# Patient Record
Sex: FEMALE | Race: BLACK OR AFRICAN AMERICAN | NOT HISPANIC OR LATINO | Employment: OTHER | URBAN - METROPOLITAN AREA
[De-identification: names, ages, dates, MRNs, and addresses within clinical notes are randomized per-mention and may not be internally consistent; named-entity substitution may affect disease eponyms.]

---

## 2017-02-16 DIAGNOSIS — E78.5 HYPERLIPIDEMIA: ICD-10-CM

## 2017-02-16 DIAGNOSIS — Z00.00 ENCOUNTER FOR GENERAL ADULT MEDICAL EXAMINATION WITHOUT ABNORMAL FINDINGS: ICD-10-CM

## 2017-02-20 ENCOUNTER — ALLSCRIPTS OFFICE VISIT (OUTPATIENT)
Dept: OTHER | Facility: OTHER | Age: 82
End: 2017-02-20

## 2017-03-21 ENCOUNTER — LAB CONVERSION - ENCOUNTER (OUTPATIENT)
Dept: OTHER | Facility: OTHER | Age: 82
End: 2017-03-21

## 2017-03-21 LAB
DEPRECATED RDW RBC AUTO: 14.7 % (ref 11–15)
FERRITIN SERPL-MCNC: 33 NG/ML (ref 20–288)
HCT VFR BLD AUTO: 36.8 % (ref 35–45)
HGB BLD-MCNC: 11.9 G/DL (ref 11.7–15.5)
MCH RBC QN AUTO: 30 PG (ref 27–33)
MCHC RBC AUTO-ENTMCNC: 32.4 G/DL (ref 32–36)
MCV RBC AUTO: 92.7 FL (ref 80–100)
PLATELET # BLD AUTO: 183 THOUSAND/UL (ref 140–400)
PMV BLD AUTO: 8.4 FL (ref 7.5–12.5)
RBC # BLD AUTO: 3.96 MILLION/UL (ref 3.8–5.1)
WBC # BLD AUTO: 4.1 THOUSAND/UL (ref 3.8–10.8)

## 2017-04-07 ENCOUNTER — GENERIC CONVERSION - ENCOUNTER (OUTPATIENT)
Dept: OTHER | Facility: OTHER | Age: 82
End: 2017-04-07

## 2017-05-05 DIAGNOSIS — Z12.31 ENCOUNTER FOR SCREENING MAMMOGRAM FOR MALIGNANT NEOPLASM OF BREAST: ICD-10-CM

## 2017-06-03 LAB
BASOPHILS # BLD AUTO: 0.7 %
BASOPHILS # BLD AUTO: 33 CELLS/UL (ref 0–200)
DEPRECATED RDW RBC AUTO: 14.1 % (ref 11–15)
EOSINOPHIL # BLD AUTO: 212 CELLS/UL (ref 15–500)
EOSINOPHIL # BLD AUTO: 4.5 %
HCT VFR BLD AUTO: 36.9 % (ref 35–45)
HGB BLD-MCNC: 12.1 G/DL (ref 11.7–15.5)
LYMPHOCYTES # BLD AUTO: 1293 CELLS/UL (ref 850–3900)
LYMPHOCYTES # BLD AUTO: 27.5 %
MCH RBC QN AUTO: 29.8 PG (ref 27–33)
MCHC RBC AUTO-ENTMCNC: 32.7 G/DL (ref 32–36)
MCV RBC AUTO: 91.1 FL (ref 80–100)
MONOCYTES # BLD AUTO: 428 CELLS/UL (ref 200–950)
MONOCYTES (HISTORICAL): 9.1 %
NEUTROPHILS # BLD AUTO: 2735 CELLS/UL (ref 1500–7800)
NEUTROPHILS # BLD AUTO: 58.2 %
PLATELET # BLD AUTO: 193 THOUSAND/UL (ref 140–400)
PMV BLD AUTO: 8.4 FL (ref 7.5–12.5)
RBC # BLD AUTO: 4.05 MILLION/UL (ref 3.8–5.1)
WBC # BLD AUTO: 4.7 THOUSAND/UL (ref 3.8–10.8)

## 2017-06-09 ENCOUNTER — ALLSCRIPTS OFFICE VISIT (OUTPATIENT)
Dept: OTHER | Facility: OTHER | Age: 82
End: 2017-06-09

## 2017-06-26 ENCOUNTER — ALLSCRIPTS OFFICE VISIT (OUTPATIENT)
Dept: OTHER | Facility: OTHER | Age: 82
End: 2017-06-26

## 2017-07-10 ENCOUNTER — GENERIC CONVERSION - ENCOUNTER (OUTPATIENT)
Dept: OTHER | Facility: OTHER | Age: 82
End: 2017-07-10

## 2017-10-10 ENCOUNTER — GENERIC CONVERSION - ENCOUNTER (OUTPATIENT)
Dept: OTHER | Facility: OTHER | Age: 82
End: 2017-10-10

## 2017-10-16 ENCOUNTER — GENERIC CONVERSION - ENCOUNTER (OUTPATIENT)
Dept: OTHER | Facility: OTHER | Age: 82
End: 2017-10-16

## 2017-10-17 ENCOUNTER — GENERIC CONVERSION - ENCOUNTER (OUTPATIENT)
Dept: OTHER | Facility: OTHER | Age: 82
End: 2017-10-17

## 2017-10-26 ENCOUNTER — LAB CONVERSION - ENCOUNTER (OUTPATIENT)
Dept: OTHER | Facility: OTHER | Age: 82
End: 2017-10-26

## 2017-10-26 LAB
A/G RATIO (HISTORICAL): 1.5 (CALC) (ref 1–2.5)
ALBUMIN SERPL BCP-MCNC: 3.7 G/DL (ref 3.6–5.1)
ALP SERPL-CCNC: 82 U/L (ref 33–130)
ALT SERPL W P-5'-P-CCNC: 25 U/L (ref 6–29)
AST SERPL W P-5'-P-CCNC: 33 U/L (ref 10–35)
BACTERIA UR QL AUTO: ABNORMAL /HPF
BASOPHILS # BLD AUTO: 0.6 %
BASOPHILS # BLD AUTO: 21 CELLS/UL (ref 0–200)
BILIRUB SERPL-MCNC: 0.6 MG/DL (ref 0.2–1.2)
BILIRUB UR QL STRIP: NEGATIVE
BUN SERPL-MCNC: 13 MG/DL (ref 7–25)
BUN/CREA RATIO (HISTORICAL): 10 (CALC) (ref 6–22)
CALCIUM SERPL-MCNC: 9.3 MG/DL (ref 8.6–10.4)
CHLORIDE SERPL-SCNC: 109 MMOL/L (ref 98–110)
CHOLEST SERPL-MCNC: 165 MG/DL
CHOLEST/HDLC SERPL: 4.2 (CALC)
CO2 SERPL-SCNC: 29 MMOL/L (ref 20–31)
COLOR UR: YELLOW
COMMENT (HISTORICAL): CLEAR
CREAT SERPL-MCNC: 1.31 MG/DL (ref 0.6–0.88)
DEPRECATED RDW RBC AUTO: 12.8 % (ref 11–15)
EGFR AFRICAN AMERICAN (HISTORICAL): 42 ML/MIN/1.73M2
EGFR-AMERICAN CALC (HISTORICAL): 36 ML/MIN/1.73M2
EOSINOPHIL # BLD AUTO: 109 CELLS/UL (ref 15–500)
EOSINOPHIL # BLD AUTO: 3.1 %
FECAL OCCULT BLOOD DIAGNOSTIC (HISTORICAL): NEGATIVE
GAMMA GLOBULIN (HISTORICAL): 2.4 G/DL (CALC) (ref 1.9–3.7)
GLUCOSE (HISTORICAL): 88 MG/DL (ref 65–99)
GLUCOSE (HISTORICAL): NEGATIVE
HCT VFR BLD AUTO: 34.4 % (ref 35–45)
HDLC SERPL-MCNC: 39 MG/DL
HGB BLD-MCNC: 11 G/DL (ref 11.7–15.5)
HYALINE CASTS #/AREA URNS LPF: ABNORMAL /LPF
KETONES UR STRIP-MCNC: NEGATIVE MG/DL
LDL CHOLESTEROL (HISTORICAL): 110 MG/DL (CALC)
LEUKOCYTE ESTERASE UR QL STRIP: ABNORMAL
LYMPHOCYTES # BLD AUTO: 1155 CELLS/UL (ref 850–3900)
LYMPHOCYTES # BLD AUTO: 33 %
MCH RBC QN AUTO: 30.4 PG (ref 27–33)
MCHC RBC AUTO-ENTMCNC: 32 G/DL (ref 32–36)
MCV RBC AUTO: 95 FL (ref 80–100)
MONOCYTES # BLD AUTO: 438 CELLS/UL (ref 200–950)
MONOCYTES (HISTORICAL): 12.5 %
NEUTROPHILS # BLD AUTO: 1778 CELLS/UL (ref 1500–7800)
NEUTROPHILS # BLD AUTO: 50.8 %
NITRITE UR QL STRIP: NEGATIVE
NON-HDL-CHOL (CHOL-HDL) (HISTORICAL): 126 MG/DL (CALC)
PH UR STRIP.AUTO: 7 [PH] (ref 5–8)
PLATELET # BLD AUTO: 158 THOUSAND/UL (ref 140–400)
PMV BLD AUTO: 11.2 FL (ref 7.5–12.5)
POTASSIUM SERPL-SCNC: 3.5 MMOL/L (ref 3.5–5.3)
PROT UR STRIP-MCNC: NEGATIVE MG/DL
RBC # BLD AUTO: 3.62 MILLION/UL (ref 3.8–5.1)
RBC (HISTORICAL): ABNORMAL /HPF
SODIUM SERPL-SCNC: 145 MMOL/L (ref 135–146)
SP GR UR STRIP.AUTO: 1.01 (ref 1–1.03)
SQUAMOUS EPITHELIAL CELLS (HISTORICAL): ABNORMAL /HPF
TOTAL PROTEIN (HISTORICAL): 6.1 G/DL (ref 6.1–8.1)
TRIGL SERPL-MCNC: 75 MG/DL
TSH SERPL DL<=0.05 MIU/L-ACNC: 4.22 MIU/L (ref 0.4–4.5)
WBC # BLD AUTO: 3.5 THOUSAND/UL (ref 3.8–10.8)
WBC # BLD AUTO: ABNORMAL /HPF

## 2017-10-30 ENCOUNTER — ALLSCRIPTS OFFICE VISIT (OUTPATIENT)
Dept: OTHER | Facility: OTHER | Age: 82
End: 2017-10-30

## 2017-11-01 DIAGNOSIS — N18.30 CHRONIC KIDNEY DISEASE, STAGE III (MODERATE) (HCC): ICD-10-CM

## 2017-11-01 DIAGNOSIS — D50.9 IRON DEFICIENCY ANEMIA: ICD-10-CM

## 2017-11-02 ENCOUNTER — GENERIC CONVERSION - ENCOUNTER (OUTPATIENT)
Dept: OTHER | Facility: OTHER | Age: 82
End: 2017-11-02

## 2018-01-12 VITALS
SYSTOLIC BLOOD PRESSURE: 146 MMHG | BODY MASS INDEX: 32.04 KG/M2 | HEART RATE: 69 BPM | TEMPERATURE: 95.8 F | WEIGHT: 199.38 LBS | OXYGEN SATURATION: 95 % | HEIGHT: 66 IN | DIASTOLIC BLOOD PRESSURE: 86 MMHG

## 2018-01-13 VITALS
BODY MASS INDEX: 32.53 KG/M2 | DIASTOLIC BLOOD PRESSURE: 96 MMHG | HEIGHT: 66 IN | HEART RATE: 68 BPM | SYSTOLIC BLOOD PRESSURE: 152 MMHG | RESPIRATION RATE: 16 BRPM | WEIGHT: 202.38 LBS

## 2018-01-13 NOTE — MISCELLANEOUS
Message  I called patient to schedule her follow up appointment with her and she stated that she does not want to come in anymore  She's going to be 80 and is tired of doctors  /DL      Active Problems    1  Abdominal pain (789 00) (R10 9)   2  Acute pharyngitis (462) (J02 9)   3  Arteriosclerotic cardiovascular disease (429 2,440 9) (I25 10)   4  Benign hypertension with chronic kidney disease, stage III (403 10,585 3) (I12 9,N18 3)   5  CKD (chronic kidney disease), stage III (585 3) (N18 3)   6  Constipation (564 00) (K59 00)   7  Depression (311) (F32 9)   8  Elevated LFTs (790 6) (R79 89)   9  Encounter for preventive health examination (V70 0) (Z00 00)   10  Encounter for screening mammogram for breast cancer (V76 12) (Z12 31)   11  Esophageal reflux (530 81) (K21 9)   12  Fatigue (780 79) (R53 83)   13  Grief reaction (309 0) (F43 20)   14  Hyperlipidemia (272 4) (E78 5)   15  Injury to ligament of cervical spine (847 0) (S13 4XXA)   16  Iron deficiency anemia (280 9) (D50 9)   17  Need for immunization against influenza (V04 81) (Z23)   18  Need for prophylactic vaccination and inoculation against influenza (V04 81) (Z23)   19  Normal mammography   20  Occult blood in stools (792 1) (R19 5)   21  Orthostatic hypotension (458 0) (I95 1)   22  Sick sinus syndrome (427 81) (I49 5)   23  Solitary pulmonary nodule (793 11) (R91 1)   24  Vitamin D deficiency (268 9) (E55 9)    Current Meds   1  AmLODIPine Besylate 5 MG Oral Tablet; TAKE 1 TABLET DAILY FOR BLOOD   PRESSURE; Therapy: 45UBS0253 to (Evaluate:68Jrj5132)  Requested for: 38Vra4186; Last   Rx:29Gaf3331 Ordered   2  Atenolol 50 MG Oral Tablet; TAKE 1 TABLET DAILY AS DIRECTED; Therapy: 27SNT6466 to (Evaluate:24Iis4102)  Requested for: 07NND4134; Last   Rx:03Jan2017 Ordered   3  Atorvastatin Calcium 10 MG Oral Tablet; TAKE 1 TABLET EVERY DAY; Therapy: 98TGG5821 to (Last Rx:22Rme9076)  Requested for: 58XJO0238 Ordered   4   Robert Aspirin 325 MG Oral Tablet; TAKE 1 TABLET DAILY; Therapy: 56DBY7743 to Recorded   5  Citalopram Hydrobromide 20 MG Oral Tablet; TAKE 1 TABLET BY MOUTH EVERY DAY; Therapy: 85PAX6243 to ([place] [facility])  Requested for: 18VPB9168; Last   Rx:02Jun2017 Ordered   6  FeRiva 21/7 75-1 MG Oral Tablet; One pill daily; Therapy: 02CRW5410 to (Last Rx:20Oct2016) Ordered   7  Ferrous Sulfate 325 (65 Fe) MG Oral Tablet; Take 1 tablet daily as directed; Therapy: 81MNG6998 to (Evaluate:62Hpi4396)  Requested for: 52GJH7352; Last   WI:82CLR8275 Ordered   8  Lumigan 0 03 % SOLN;   Therapy: 93Qbs4577 to (Last Rx:96Jge8113)  Requested for: 31Mlm6392 Ordered   9  Omeprazole 20 MG Oral Capsule Delayed Release; TAKE 1 CAPSULE TWICE DAILY; Therapy: 24MNB4174 to (Evaluate:59Rjw8127)  Requested for: 51Ehb3480; Last   Rx:38Lud0184 Ordered   10  Timolol Maleate 0 5 % Ophthalmic Solution; INSTILL 1 DROP IN BOTH EYES EVERY 12    HOURS DAILY; Therapy: (Recorded:39Xhe3314) to Recorded    Allergies    1   No Known Drug Allergies    Signatures   Electronically signed by : NALLELY Kitchen ; Oct 11 2017  8:20AM EST                       (Author)

## 2018-01-14 VITALS
SYSTOLIC BLOOD PRESSURE: 132 MMHG | TEMPERATURE: 96.4 F | HEIGHT: 66 IN | BODY MASS INDEX: 32.36 KG/M2 | DIASTOLIC BLOOD PRESSURE: 80 MMHG | OXYGEN SATURATION: 98 % | WEIGHT: 201.38 LBS | HEART RATE: 76 BPM

## 2018-01-17 NOTE — PROGRESS NOTES
Assessment    1  Iron deficiency anemia (280 9) (D50 9)   2  Arteriosclerotic cardiovascular disease (429 2,440 9) (I25 10)   3  CKD (chronic kidney disease), stage III (585 3) (N18 3)   4  Hyperlipidemia (272 4) (E78 5)   5  Sick sinus syndrome (427 81) (I49 5)   6  Encounter for preventive health examination (V70 0) (Z00 00)    Plan  Health Maintenance    · Follow-up visit in 4 Months Evaluation and Treatment  Follow-up  Status: Hold For -  Scheduling  Requested for: 30Oct2017  Iron deficiency anemia    · (1) CBC/PLT/DIFF; Status:Active; Requested AWP:86YSX9683;     Discussion/Summary    1  Iron deficiency anemia  Patient is undergone a complete GI workup and evaluation with no findings of any specific site that may be causing bleeding  Patient will continue without iron supplements and we will check another CBC when she returns to the office in 4 months  Patient was told if any have normal blood in the rectum, black stools to please call for further evaluation and treatment  2  Atherosclerotic cardiovascular disease  As mentioned patient was recently seen by her cardiologist and no specific testing is planned at this point time  3  Chronic kidney disease  Patient's kidney function is stable and patient will continue follow-up with Nephrology  Patient is complaining that the doctors are making her drink too much fluids which is necessary to prevent dehydration and worsening of her kidney function  4  Hyperlipidemia  Patient's cholesterol is under good control and patient is to continue present medication  5  Sick sinus syndrome with pacemaker  As noted above patient continues to follow-up with Cardiology and has regular pacemaker checks  6  Health maintenance  Patient is up-to-date with all evaluations and treatments especially considering her age  Patient is refusing all vaccines including influenza vaccine, pneumococcal vaccine, herpes zoster vaccine   Patient will be seen again in the office in approximately 4 months and we will check a CBC prior to that visit  Impression: Subsequent Annual Wellness Visit, with preventive exam as well as age and risk appropriate counseling completed  Cardiovascular screening and counseling: the risks and benefits of screening were discussed and screening is current  Diabetes screening and counseling: the risks and benefits of screening were discussed and screening is current  Colorectal cancer screening and counseling: the risks and benefits of screening were discussed and screening is current  Breast cancer screening and counseling: the risks and benefits of screening were discussed and screening is current  Cervical cancer screening and counseling: the patient declines screening  Osteoporosis screening and counseling: the risks and benefits of screening were discussed and screening is current  Abdominal aortic aneurysm screening and counseling: screening not indicated  Glaucoma screening and counseling: the risks and benefits of screening were discussed and screening is current  HIV screening and counseling: screening not indicated  Hepatitis C Screening: the patient was counseled on Hepatitis C screening  The patient declines Hepatitis C screening  Immunizations: the risks and benefits of influenza vaccination were discussed with the patient, the patient declines the influenza vaccination, the risks and benefits of pneumococcal vaccination were discussed with the patient, the patient declines the pneumococcal vaccination, hepatitis B vaccination series is not indicated at this time due to the patient's low risk of villa the disease, the risks and benefits of the Zostavax vaccine were discussed with the patient, the patient declines the Zostavax vaccine, the risks and benefits of the Td vaccine were discussed with the patient and the patient declines the Td vaccine  Advance Directive Planning: complete and up to date   Patient Discussion: plan discussed with the patient, follow-up visit needed in Four months  Possible side effects of new medications were reviewed with the patient/guardian today  The treatment plan was reviewed with the patient/guardian  The patient/guardian understands and agrees with the treatment plan      Chief Complaint  Patient is here today for a subsequent medicare physical w/ labs  Advance Directives  Advance Directive [facility]:   YES - Patient has an advance health care directive  The patient has a living will located  in patient's home and with patient's   Durable Power of  For Healthcare:    Name: Betsy Viera   Relationship: Daughter   Capacity/Competence: This patient has full decision making capacity for discussion of advance care planning and This patient has full decision making competency for discussion of advance care planning  Summary of Advance Directive Conversation  Patient states she is not completely certain of what is described on her living will  She states all decisions will be made by her daughter who she feels is competent in knowing her wishes and desires  The provider spent 5 minutes minutes discussing Advance Directives  History of Present Illness  HPI: Patient is an 70-year-old female with a history of hyperlipidemia, coronary artery disease, hypertension, atherosclerotic cardiovascular disease, chronic kidney disease, chronic anemia  Patient is here today for her routine Medicare wellness visit  Patient states that she is doing relatively well and has no new complaints or problems  She is denying any chest pain or pressure and no increasing shortness of breath with exertion  She was seen recently by her cardiologist and no new testing is planned  She states her appetite is good and she has no bowel or bladder problems  We did discuss recent lab testing which does show a slight anemia but stable and no other major medical problems   We did discuss receiving an influenza vaccine today and she refuses  She continues to follow-up with her nephrologist   Welcome to Medicare and Wellness Visits: The patient is being seen for the subsequent annual wellness visit  Co-Managers and Medical Equipment/Suppliers: See Patient Care Team   Reviewed Updated [address]:   Last Medicare Wellness Visit Information was reviewed, patient interviewed, no change since last AWV  Preventive Quality Program 65 and Older: Falls Risk: The patient fell times in the past 12 months  The patient is currently asymptomatic Symptoms Include:  Associated symptoms:  no pain from the injury  Urinary Incontinence Symptoms includes: nocturia    Date of last glaucoma screen was Patient states she is seeing the eye doctor on regular basis and has glaucoma screening performed      Patient Care Team    Care Team Member Role Specialty Office Number   lCarence FAYE  Nephrology [phone], [address] [place] [address]  Internal Medicine [phone]     Review of Systems    Constitutional: negative  Head and Face: negative  Eyes: negative  ENT: negative  Cardiovascular: lower extremity edema and Occasional tremor and edema to lower extremities when she has been on her feet all day but this is appreciably decreased from previously, but no chest pain, no palpitations, the heart is not racing and no lightheadedness  Respiratory: negative  Gastrointestinal: negative  Genitourinary: negative  Musculoskeletal: negative  Integumentary and Breasts: negative  Neurological: negative  Psychiatric: negative  Endocrine: negative  Hematologic and Lymphatic: negative  Active Problems    1  Abdominal pain (789 00) (R10 9)   2  Acute pharyngitis (462) (J02 9)   3  Arteriosclerotic cardiovascular disease (429 2,440 9) (I25 10)   4  Benign hypertension with chronic kidney disease, stage III (403 10,585 3) (I12 9,N18 3)   5  CKD (chronic kidney disease), stage III (585 3) (N18 3)   6   Constipation (564 00) (K59 00)   7  Depression (311) (F32 9)   8  Elevated LFTs (790 6) (R79 89)   9  Encounter for preventive health examination (V70 0) (Z00 00)   10  Encounter for screening mammogram for breast cancer (V76 12) (Z12 31)   11  Esophageal reflux (530 81) (K21 9)   12  Fatigue (780 79) (R53 83)   13  Grief reaction (309 0) (F43 20)   14  Hyperlipidemia (272 4) (E78 5)   15  Injury to ligament of cervical spine (847 0) (S13 4XXA)   16  Iron deficiency anemia (280 9) (D50 9)   17  Need for immunization against influenza (V04 81) (Z23)   18  Need for prophylactic vaccination and inoculation against influenza (V04 81) (Z23)   19  Normal mammography   20  Occult blood in stools (792 1) (R19 5)   21  Orthostatic hypotension (458 0) (I95 1)   22  Sick sinus syndrome (427 81) (I49 5)   23  Solitary pulmonary nodule (793 11) (R91 1)   24  Vitamin D deficiency (268 9) (E55 9)    Surgical History    · History of Appendectomy   · History of Breast Surgery Reduction Procedure Bilateral   · History of Heart Surgery   · History of Knee Replacement   · History of Neck Surgery   · History of Pacemaker Permanent Placement    The surgical history was reviewed and updated today  Family History  Father    · Family history of malignant neoplasm of brain (V16 8) (Z80 8)  Brother    · Family history of Abdominal Aortic Aneurysm Repair For Dilation Or Occlusion   · Family history of malignant neoplasm of brain (V16 8) (Z80 8)    The family history was reviewed and updated today  Social History    · Denied: Caffeine use (V49 89) (F15 90)   · Denied: Drug use (305 90) (F19 90)   · Never A Smoker   · Social alcohol use (Z78 9)  The social history was reviewed and updated today  The social history was reviewed and is unchanged  Current Meds   1  AmLODIPine Besylate 5 MG Oral Tablet; TAKE 1 TABLET DAILY FOR BLOOD   PRESSURE; Therapy: 63KSH5797 to ([phone])  Requested for: 46FZS2691; Last   Rx:12Oct2017 Ordered   2  Atenolol 50 MG Oral Tablet; TAKE 1 TABLET DAILY AS DIRECTED; Therapy: 08CSO4685 to (Evaluate:92Fgw2217)  Requested for: 82CLZ1753; Last   Rx:03Jan2017 Ordered   3  Atorvastatin Calcium 10 MG Oral Tablet; TAKE 1 TABLET EVERY DAY; Therapy: 94WUS3073 to (Last Rx:12Oct2017)  Requested for: 12Oct2017 Ordered   4  Robert Aspirin 325 MG Oral Tablet; TAKE 1 TABLET DAILY; Therapy: 75QNB4301 to Recorded   5  Citalopram Hydrobromide 20 MG Oral Tablet; TAKE 1 TABLET BY MOUTH EVERY DAY; Therapy: 58ZVX5326 to (Meron Aviles)  Requested for: 12PJA5705; Last   Rx:02Jun2017 Ordered   6  Eliquis 2 5 MG Oral Tablet; Therapy: 35VSY1594 to Recorded   7  Ferrous Sulfate 325 (65 Fe) MG Oral Tablet; Take 1 tablet daily as directed; Therapy: 48LOB1859 to (Evaluate:24Bmu7486)  Requested for: 57XVB3422; Last   JQ:88VVU2155 Ordered   8  Lumigan 0 03 % SOLN;   Therapy: 29Ppa5984 to (Last Rx:34Lxk6217)  Requested for: 52Fsr6610 Ordered   9  Omeprazole 20 MG Oral Capsule Delayed Release; TAKE 1 CAPSULE TWICE DAILY; Therapy: 09BKR6040 to (Evaluate:49Kok5115)  Requested for: 21Hnc9185; Last   Rx:40Jko8884 Ordered   10  Timolol Maleate 0 5 % Ophthalmic Solution; INSTILL 1 DROP IN BOTH EYES EVERY 12    HOURS DAILY; Therapy: (Recorded:91Ake3701) to Recorded    The medication list was reviewed and updated today  Allergies    1  No Known Drug Allergies    Immunizations   1    Influenza  Temporarily Deferred: Pt refuses     Vitals  Signs    Temperature: 96 9 F  Heart Rate: 67  Systolic: 463  Diastolic: 76  Height: 5 ft 6 in  Weight: 201 lb   BMI Calculated: 32 44  BSA Calculated: 2  O2 Saturation: 97    Physical Exam    Constitutional Pleasant elderly female who is awake alert and in no acute distress and oriented x3  Head and Face   Head and face: Normal     Palpation of the face and sinuses: No sinus tenderness  Eyes   Conjunctiva and lids: Abnormal   Bilateral ptosis which obstructs her vision     Pupils and irises: Equal, round, reactive to light  Ophthalmoscopic examination: Abnormal   Pupils are small and he could see fundi  Ears, Nose, Mouth, and Throat   External inspection of ears and nose: Normal     Otoscopic examination: Tympanic membranes translucent with normal light reflex  Canals patent without erythema  Hearing: Normal     Nasal mucosa, septum, and turbinates: Normal without edema or erythema  Lips, teeth, and gums: Normal, good dentition  Oropharynx: Normal with no erythema, edema, exudate or lesions  Neck   Neck: Supple, symmetric, trachea midline, no masses  Thyroid: Normal, no thyromegaly  Pulmonary   Respiratory effort: No increased work of breathing or signs of respiratory distress  Percussion of chest: Normal     Palpation of chest: Normal     Auscultation of lungs: Clear to auscultation  Cardiovascular   Palpation of heart: Normal PMI, no thrills  Auscultation of heart: Normal rate and rhythm, normal S1 and S2, no murmurs  Carotid pulses: 2+ bilaterally  Abdominal aorta: Normal     Femoral pulses: 2+ bilaterally  Pedal pulses: 2+ bilaterally  Peripheral vascular exam: Normal     Examination of extremities for edema and/or varicosities: Abnormal   Trace chronic edema bilaterally without change  Chest Patient has a slip for a routine mammogram    Abdomen   Abdomen: Non-tender, no masses  Liver and spleen: No hepatomegaly or splenomegaly  Examination for hernias: No hernia appreciated  Anus, perineum, and rectum: Abnormal   Deferred exam at patient's request    Genitourinary Patient's gynecologist states she no longer needs regular exams  Lymphatic   Palpation of lymph nodes in neck: No lymphadenopathy  Palpation of lymph nodes in axillae: No lymphadenopathy  Palpation of lymph nodes in groin: No lymphadenopathy  Palpation of lymph nodes in other areas: No lymphadenopathy      Musculoskeletal   Gait and station: Abnormal   Mildly antalgic gait walks with a walker but seems steady  Digits and nails: Abnormal   Diffuse degenerative changes  Joints, bones, and muscles: Abnormal   Diffuse degenerative changes  Range of motion: Normal     Stability: Normal     Muscle strength/tone: Normal     Skin   Skin and subcutaneous tissue: Normal without rashes or lesions  Palpation of skin and subcutaneous tissue: Normal turgor  Neurologic   Cranial nerves: Cranial nerves II-XII intact  Cortical function: Normal mental status  Reflexes: Abnormal   Absent Achilles and patellar reflexes  Sensation: No sensory loss  Coordination: Normal finger to nose and heel to shin  Psychiatric   Judgment and insight: Normal     Orientation to person, place, and time: Normal     Recent and remote memory: Intact      Mood and affect: Normal        Future Appointments    Date/Time Provider Specialty Site   02/26/2018 03:30 PM Dom Denny DO Internal Medicine [facility] INTERNAL MED     Signatures   Electronically signed by : Ugo Zheng DO; Oct 30 2017  4:33PM EST                       (Author)

## 2018-01-22 VITALS
BODY MASS INDEX: 32.3 KG/M2 | WEIGHT: 201 LBS | HEIGHT: 66 IN | TEMPERATURE: 96.9 F | DIASTOLIC BLOOD PRESSURE: 76 MMHG | HEART RATE: 67 BPM | OXYGEN SATURATION: 97 % | SYSTOLIC BLOOD PRESSURE: 132 MMHG

## 2018-01-24 NOTE — PROGRESS NOTES
Assessment    1  Anemia (285 9) (D64 9)   2  CKD (chronic kidney disease), stage III (585 3) (N18 3)   3  Arteriosclerotic cardiovascular disease (429 2,440 9) (I25 10)   4  Hypertension (401 9) (I10)    Plan  Anemia    · FeRiva 21/7 75-1 MG Oral Tablet; One pill daily   · (1) CBC/PLT/DIFF; Status:Active; Requested for:20Oct2016;   Hypertension    · Follow-up visit in 4 Months Evaluation and Treatment  Follow-up  Status: Hold For -  Scheduling  Requested for: 84XZT5425    Discussion/Summary    #1  Anemia  With her lab values the seems to be basically and iron deficiency  Patient is not taking any iron supplements and they were started today  Patient was given samples and prescription for iron supplement and she was told to take it daily  She is also informed if any gastrointestinal upset or problems from the medication to please call  We'll check a CBC with her next visit  #2  Chronic kidney disease stage III  She states it is very difficult for her to go out to see the nephrologist but I did reinforce to her the importance of seeing this doctor in making sure we continue to monitor her kidney function closely  #3  Atherosclerotic cardiovascular disease, hyperlipidemia  Patient is to continue on lower dose of Lipitor as per her cardiologist  Patient has had adverse reaction in the past to higher doses of statins  #3  Hypertension  Controlled with present treatment  Impression: Subsequent Annual Wellness Visit, with preventive exam as well as age and risk appropriate counseling completed  Cardiovascular screening and counseling: the risks and benefits of screening were discussed and screening is current  Diabetes screening and counseling: the risks and benefits of screening were discussed and screening is current  Colorectal cancer screening and counseling: the risks and benefits of screening were discussed, screening is current and the patient declines screening     Breast cancer screening and counseling: the risks and benefits of screening were discussed and screening is current  Cervical cancer screening and counseling: the risks and benefits of screening were discussed and screening not indicated  Osteoporosis screening and counseling: the risks and benefits of screening were discussed and screening is current  Abdominal aortic aneurysm screening and counseling: screening not indicated  Glaucoma screening and counseling: the risks and benefits of screening were discussed and screening is current  HIV screening and counseling: screening not indicated  Immunizations: the risks and benefits of influenza vaccination were discussed with the patient, the patient declines the influenza vaccination, the risks and benefits of pneumococcal vaccination were discussed with the patient, the patient declines the pneumococcal vaccination, hepatitis B vaccination series is not indicated at this time due to the patient's low risk of villa the disease, the risks and benefits of the Zostavax vaccine were discussed with the patient, the patient declines the Zostavax vaccine, the risks and benefits of the Td vaccine were discussed with the patient and the patient declines the Td vaccine  Advance Directive Planning: complete and up to date  Patient Discussion: plan discussed with the patient, follow-up visit needed in 4 months  Chief Complaint  Patient is here today for a yearly wellness exam      Advance Directives  Advance Directive [facility]:   YES - Patient has an advance health care directive  The patient has a living will located  in patient's home and with patient's   Durable Power of  For Healthcare:    Name: Vamsi Khan   Relationship: Daughter   Alternate Health Care Proxy:    Name: Kerman Bamberger   Relationship: Daughter   Capacity/Competence:  This patient has full decision making capacity for discussion of advance care planning and This patient has full decision making competency for discussion of advance care planning  Summary of Advance Directive Conversation  A shouldn't states that she is unsure specifically as to the wording of her living will  Patient does state that she would like comfort care only  History of Present Illness  HPI: Patient is an 70-year-old female with a history of multiple medical problems including atherosclerotic cardiovascular disease, chronic kidney disease stage III, gastroesophageal reflux disease, depression, hypertension, hyperlipidemia  Patient is here today for routine Medicare wellness exams  She did have complete labs prior to being seen today and we did discuss the results  Of note patient's cholesterol is still running a little bit high but this was noted by her cardiologist who is decided not to increase her medication  She is mildly anemic at 10 1 and her iron count is low  Patient also was noted to have stable renal insufficiency and this is being followed by nephrologist  Patient states in general she's feeling relatively well and has no new complaints or problems  She denies any chest pain or pressure and no increasing shortness of breath with exertion  She states her appetite is good and she's trying to eat healthy  Welcome to [employer] and Wellness Visits: The patient is being seen for the subsequent annual wellness visit  Medicare Screening and Risk Factors   Hospitalizations: she has been previously hospitalizied  Once per lifetime medicare screening tests: ECG  Medicare Screening Tests Risk Questions   Abdominal aortic aneurysm risk assessment: none indicated  Osteoporosis risk assessment: female gender and over 48years of age  HIV risk assessment: none indicated  Drug and Alcohol Use: The patient has never smoked cigarettes  The patient reports never drinking alcohol  She has never used illicit drugs  Diet and Physical Activity: Current diet includes well balanced meals and limited junk food  She exercises infrequently  Exercise: walking  Mood Disorder and Cognitive Impairment Screening: Geriatric Depression Scale   Depression screening score was Total of 4 on the geriatric depression scale   no significant symptoms  She denies feeling down, depressed, or hopeless over the past two weeks  She denies feeling little interest or pleasure in doing things over the past two weeks  Cognitive impairment screening: denies difficulty learning/retaining new information, denies difficulty handling complex tasks, denies difficulty with reasoning, denies difficulty with spatial ability and orientation, denies difficulty with language, denies difficulty with behavior and Total of 30 on the Mini-Mental Status exam    Functional Ability/Level of Safety: Hearing is normal bilaterally  The patient is currently able to do activities of daily living with limitations, able to do instrumental activities of daily living with limitations, able to participate in social activities with limitations and able to drive with limitations  Activities of daily living details: does not need help using the phone, no transportation help needed, does not need help shopping, no meal preparation help needed, does not need help doing housework, does not need help doing laundry and does not need help managing medications  Fall risk factors:  antihypertensive use, but The patient fell 0 times in the past 12 months , no polypharmacy, no alcohol use, no mobility impairment, no antidepressant use, no deconditioning, no postural hypotension, no sedative use, no visual impairment, no urinary incontinence, no cognitive impairment, up and go test was normal and no previous fall  Home safety risk factors:  no unfamiliar surroundings, no loose rugs, no poor household lighting, no uneven floors, no household clutter, grab bars in the bathroom and handrails on the stairs     Advance Directives: Advance directives: living will, durable power of  for health care directives and advance directives  end of life decisions were reviewed with the patient and I agree with the patient's decisions  Co-Managers and Medical Equipment/Suppliers: See Patient Care Team   Reviewed Updated [facility]:   Last Medicare Wellness Visit Information was reviewed, patient interviewed and updates made to the record today  Preventive Quality Program 65 and Older: Falls Risk: The patient fell 0 times in the past 12 months  The patient is currently asymptomatic Symptoms Include:  Associated symptoms:  No associated symptoms are reported  Urinary Incontinence Symptoms includes: nocturia, but no urinary incontinence, no incomplete bladder emptying, no urinary frequency, no urinary urgency, no urinary hesitancy, no dysuria, no weak stream, no intermittent stream, no post-void dribbling, no vaginal pressure and no vaginal dryness    Date of last glaucoma screen was Infection states that she sees the ophthalmologist at least twice a year and does have glaucoma check at that time      Patient Care Team    Care Team Member Role Specialty Office Number   Veronique Jane M D  Nephrology [phone]     Review of Systems    Constitutional: negative  Head and Face: negative  Eyes: negative  ENT: negative  Cardiovascular: lower extremity edema and Occasional lower extremity edema but not severe or debilitating, but no chest pain, no palpitations, the heart is not racing and no lightheadedness  Respiratory: negative  Gastrointestinal: negative  Genitourinary: negative  Musculoskeletal: diffuse joint pain and joint stiffness, but no generalized muscle aches, no back pain, no joint swelling, no back muscle spasm, no pain in other joints and no limping  Integumentary and Breasts: negative  Neurological: negative  Psychiatric: negative  Endocrine: negative  Hematologic and Lymphatic: negative  Active Problems    1  Abdominal pain (789 00) (R10 9)   2  Acute pharyngitis (462) (J02 9)   3  Anemia (285 9) (D64 9)   4  Arteriosclerotic cardiovascular disease (429 2,440 9) (I25 10)   5  CKD (chronic kidney disease), stage III (585 3) (N18 3)   6  Constipation (564 00) (K59 00)   7  Depression (311) (F32 9)   8  Edema leg (782 3) (R60 0)   9  Elevated LFTs (790 6) (R79 89)   10  Encounter for preventive health examination (V70 0) (Z00 00)   11  Esophageal reflux (530 81) (K21 9)   12  Fatigue (780 79) (R53 83)   13  Grief reaction (309 0) (F43 20)   14  Hyperlipidemia (272 4) (E78 5)   15  Hypertension (401 9) (I10)   16  Injury to ligament of cervical spine (847 0) (S13 4XXA)   17  Need for immunization against influenza (V04 81) (Z23)   18  Need for prophylactic vaccination and inoculation against influenza (V04 81) (Z23)   19  Normal mammography   20  Occult blood in stools (792 1) (R19 5)   21  Orthostatic hypotension (458 0) (I95 1)   22  Sick sinus syndrome (427 81) (I49 5)   23  Solitary pulmonary nodule (793 11) (R91 1)   24  Vitamin D deficiency (268 9) (E55 9)    Past Medical History    The active problems and past medical history were reviewed and updated today  Surgical History    · History of Appendectomy    The surgical history was reviewed and updated today  Family History  Father    · Family history of malignant neoplasm of brain (V16 8) (Z80 8)  Brother    · Family history of Abdominal Aortic Aneurysm Repair For Dilation Or Occlusion   · Family history of malignant neoplasm of brain (V16 8) (Z80 8)    The family history was reviewed and updated today  Social History    · Never A Smoker  The social history was reviewed and updated today  The social history was reviewed and is unchanged  Current Meds   1  AmLODIPine Besylate 5 MG Oral Tablet; TAKE 1 TABLET DAILY FOR BLOOD   PRESSURE; Therapy: 18ONG9526 to (Evaluate:00Xos1048)  Requested for: 35Med2535; Last   Rx:[id number] Ordered   2  Atenolol 50 MG Oral Tablet; TAKE 1 TABLET DAILY AS DIRECTED;    Therapy: 45RFD4441 to (Evaluate:55Vdg4145)  Requested for: 43RUS0528; Last   Rx:71Wda6586 Ordered   3  Atorvastatin Calcium 10 MG Oral Tablet; TAKE 1 TABLET EVERY DAY; Therapy: 40EZB1119 to (Last Rx:51Ztk9389)  Requested for: 81BEY0914 Ordered   4  Robert Aspirin 325 MG Oral Tablet; TAKE 1 TABLET DAILY; Therapy: 84SXN6418 to Recorded   5  Citalopram Hydrobromide 20 MG Oral Tablet; TAKE 1 TABLET BY MOUTH EVERY DAY; Therapy: 72YKK2047 to (Evaluate:01Elq9232)  Requested for: 71JGV4281; Last   Rx:37Dyc6646 Ordered   6  Lumigan 0 03 % SOLN;   Therapy: 26Bna1934 to (Last Rx:18Axz7475)  Requested for: 36Nzy8365 Ordered   7  Omeprazole 20 MG Oral Capsule Delayed Release; TAKE 1 CAPSULE TWICE DAILY; Therapy: 97ONK1790 to (Evaluate:89Taa9363)  Requested for: 34Jib8930; Last   Rx:87Cuo9683 Ordered   8  Timolol Maleate 0 5 % Ophthalmic Solution; INSTILL 1 DROP IN BOTH EYES EVERY 12   HOURS DAILY; Therapy: (Recorded:53Ixd6927) to Recorded    The medication list was reviewed and updated today  Allergies    1  No Known Drug Allergies    Immunizations   1    Influenza  Temporarily Deferred: Pt refuses     Vitals  Signs    Systolic: 021  Diastolic: 68  Heart Rate: 71  Temperature: 97 F  O2 Saturation: 95  Height: 5 ft 6 in  Weight: 193 lb 4 00 oz  BMI Calculated: 31 19  BSA Calculated: 1 98    Physical Exam    Constitutional Pleasant elderly female who is awake alert and in no acute distress and oriented x3  Head and Face   Head and face: Normal     Palpation of the face and sinuses: No sinus tenderness  Eyes   Conjunctiva and lids: Abnormal   Bilateral ptosis which obstructs her vision  Pupils and irises: Equal, round, reactive to light  Ophthalmoscopic examination: Abnormal   Pupils are small and he could see fundi  Ears, Nose, Mouth, and Throat   External inspection of ears and nose: Normal     Otoscopic examination: Tympanic membranes translucent with normal light reflex  Canals patent without erythema      Hearing: Normal     Nasal mucosa, septum, and turbinates: Normal without edema or erythema  Lips, teeth, and gums: Normal, good dentition  Oropharynx: Normal with no erythema, edema, exudate or lesions  Neck   Neck: Supple, symmetric, trachea midline, no masses  Thyroid: Normal, no thyromegaly  Pulmonary   Respiratory effort: No increased work of breathing or signs of respiratory distress  Percussion of chest: Normal     Palpation of chest: Normal     Auscultation of lungs: Clear to auscultation  Cardiovascular   Palpation of heart: Normal PMI, no thrills  Auscultation of heart: Normal rate and rhythm, normal S1 and S2, no murmurs  Carotid pulses: 2+ bilaterally  Abdominal aorta: Normal     Femoral pulses: 2+ bilaterally  Pedal pulses: 2+ bilaterally  Peripheral vascular exam: Normal     Examination of extremities for edema and/or varicosities: Abnormal   Trace chronic edema bilaterally without change  Chest Patient had recent exam of her breast by her gynecologist and goes for her mammograms  Abdomen   Abdomen: Abnormal   Obese soft nontender with positive bowel sounds Ã4 quadrants  Liver and spleen: No hepatomegaly or splenomegaly  Examination for hernias: No hernia appreciated  Anus, perineum, and rectum: Abnormal   Deferred exam at patient's request    Genitourinary Patient's gynecologist states she no longer needs regular exams  Lymphatic   Palpation of lymph nodes in neck: No lymphadenopathy  Palpation of lymph nodes in axillae: No lymphadenopathy  Palpation of lymph nodes in groin: No lymphadenopathy  Palpation of lymph nodes in other areas: No lymphadenopathy  Musculoskeletal   Gait and station: Abnormal   Mildly antalgic gait walks with a walker but seems steady  Digits and nails: Abnormal   Diffuse degenerative changes  Joints, bones, and muscles: Abnormal   Diffuse degenerative changes     Range of motion: Normal     Stability: Normal     Muscle strength/tone: Normal     Skin   Skin and subcutaneous tissue: Normal without rashes or lesions  Palpation of skin and subcutaneous tissue: Normal turgor  Neurologic   Cranial nerves: Cranial nerves II-XII intact  Cortical function: Normal mental status  Reflexes: Abnormal   Absent Achilles and patellar reflexes  Sensation: No sensory loss  Coordination: Normal finger to nose and heel to shin  Psychiatric   Judgment and insight: Normal     Orientation to person, place, and time: Normal     Recent and remote memory: Intact  Mood and affect: Normal        Results/Data  PHQ-2 Adult Depression Screening 20Oct2016 04:05PM User, s     Test Name Result Flag Reference   PHQ-2 Adult Depression Score 0     Over the last two weeks, how often have you been bothered by any of the following problems?   Little interest or pleasure in doing things: Not at all - 0  Feeling down, depressed, or hopeless: Not at all - 0   PHQ-2 Adult Depression Screening Negative         Future Appointments    Date/Time Provider Specialty Site   02/20/2017 09:45 AM Nikky Cortés DO Internal Medicine [facility] INTERNAL MED     Signatures   Electronically signed by : Toni Beltran DO; Oct 20 2016  6:02PM EST                       (Author)

## 2018-02-22 LAB
BASOPHILS # BLD AUTO: 20 CELLS/UL (ref 0–200)
BASOPHILS NFR BLD AUTO: 0.6 %
EOSINOPHIL # BLD AUTO: 109 CELLS/UL (ref 15–500)
EOSINOPHIL NFR BLD AUTO: 3.2 %
ERYTHROCYTE [DISTWIDTH] IN BLOOD BY AUTOMATED COUNT: 12.8 % (ref 11–15)
HCT VFR BLD AUTO: 36.2 % (ref 35–45)
HGB BLD-MCNC: 11.2 G/DL (ref 11.7–15.5)
LYMPHOCYTES # BLD AUTO: 850 CELLS/UL (ref 850–3900)
LYMPHOCYTES NFR BLD AUTO: 25 %
MCH RBC QN AUTO: 29.5 PG (ref 27–33)
MCHC RBC AUTO-ENTMCNC: 30.9 G/DL (ref 32–36)
MCV RBC AUTO: 95.3 FL (ref 80–100)
MONOCYTES # BLD AUTO: 490 CELLS/UL (ref 200–950)
MONOCYTES NFR BLD AUTO: 14.4 %
NEUTROPHILS # BLD AUTO: 1931 CELLS/UL (ref 1500–7800)
NEUTROPHILS NFR BLD AUTO: 56.8 %
PLATELET # BLD AUTO: 184 THOUSAND/UL (ref 140–400)
PMV BLD REES-ECKER: 11.1 FL (ref 7.5–12.5)
RBC # BLD AUTO: 3.8 MILLION/UL (ref 3.8–5.1)
WBC # BLD AUTO: 3.4 THOUSAND/UL (ref 3.8–10.8)

## 2018-02-25 DIAGNOSIS — I10 ESSENTIAL HYPERTENSION: Primary | ICD-10-CM

## 2018-02-26 ENCOUNTER — OFFICE VISIT (OUTPATIENT)
Dept: INTERNAL MEDICINE CLINIC | Facility: CLINIC | Age: 83
End: 2018-02-26
Payer: COMMERCIAL

## 2018-02-26 VITALS
TEMPERATURE: 96.3 F | OXYGEN SATURATION: 94 % | HEART RATE: 53 BPM | WEIGHT: 186 LBS | HEIGHT: 66 IN | DIASTOLIC BLOOD PRESSURE: 68 MMHG | BODY MASS INDEX: 29.89 KG/M2 | SYSTOLIC BLOOD PRESSURE: 116 MMHG

## 2018-02-26 DIAGNOSIS — N18.30 BENIGN HYPERTENSION WITH CHRONIC KIDNEY DISEASE, STAGE III (HCC): ICD-10-CM

## 2018-02-26 DIAGNOSIS — I12.9 BENIGN HYPERTENSION WITH CHRONIC KIDNEY DISEASE, STAGE III (HCC): ICD-10-CM

## 2018-02-26 DIAGNOSIS — E78.00 PURE HYPERCHOLESTEROLEMIA: ICD-10-CM

## 2018-02-26 DIAGNOSIS — I48.0 PAROXYSMAL ATRIAL FIBRILLATION (HCC): ICD-10-CM

## 2018-02-26 DIAGNOSIS — F32.9 REACTIVE DEPRESSION: ICD-10-CM

## 2018-02-26 DIAGNOSIS — I25.10 ARTERIOSCLEROTIC CARDIOVASCULAR DISEASE: ICD-10-CM

## 2018-02-26 DIAGNOSIS — D50.8 OTHER IRON DEFICIENCY ANEMIA: Primary | ICD-10-CM

## 2018-02-26 PROCEDURE — 99214 OFFICE O/P EST MOD 30 MIN: CPT | Performed by: INTERNAL MEDICINE

## 2018-02-26 RX ORDER — APIXABAN 2.5 MG/1
TABLET, FILM COATED ORAL
Refills: 1 | COMMUNITY
Start: 2018-01-24 | End: 2020-05-08 | Stop reason: SDUPTHER

## 2018-02-26 RX ORDER — FERROUS SULFATE 325(65) MG
TABLET ORAL
Refills: 0 | COMMUNITY
Start: 2018-02-09 | End: 2018-03-20 | Stop reason: SDUPTHER

## 2018-02-26 RX ORDER — DORZOLAMIDE HYDROCHLORIDE AND TIMOLOL MALEATE 20; 5 MG/ML; MG/ML
SOLUTION/ DROPS OPHTHALMIC
COMMUNITY
Start: 2013-05-14 | End: 2019-06-07 | Stop reason: SDUPTHER

## 2018-02-26 RX ORDER — BIMATOPROST 0.01 %
DROPS OPHTHALMIC (EYE)
Refills: 0 | COMMUNITY
Start: 2018-02-07 | End: 2020-05-08 | Stop reason: SDUPTHER

## 2018-02-26 RX ORDER — MELATONIN
2000
COMMUNITY
Start: 2016-03-04

## 2018-02-26 RX ORDER — ATORVASTATIN CALCIUM 10 MG/1
TABLET, FILM COATED ORAL
Refills: 0 | COMMUNITY
Start: 2018-01-24 | End: 2018-02-27 | Stop reason: SDUPTHER

## 2018-02-26 RX ORDER — AMLODIPINE BESYLATE 5 MG/1
TABLET ORAL
Refills: 0 | COMMUNITY
Start: 2018-01-22 | End: 2018-11-27 | Stop reason: SDUPTHER

## 2018-02-26 RX ORDER — ATENOLOL 50 MG/1
TABLET ORAL
Qty: 90 TABLET | Refills: 3 | Status: SHIPPED | OUTPATIENT
Start: 2018-02-26 | End: 2018-12-14 | Stop reason: SDUPTHER

## 2018-02-26 RX ORDER — OMEPRAZOLE 20 MG/1
1 CAPSULE, DELAYED RELEASE ORAL 2 TIMES DAILY
COMMUNITY
Start: 2012-03-05 | End: 2018-02-26

## 2018-02-26 RX ORDER — CITALOPRAM 20 MG/1
1 TABLET ORAL DAILY
COMMUNITY
Start: 2011-09-21 | End: 2019-04-03

## 2018-02-26 NOTE — ASSESSMENT & PLAN NOTE
Depression  We did have a long discussion with the patient today about her depression  Patient states she has been on citalopram since the death of her  approximately 17 years ago  We did discuss with the patient weaning her off of the citalopram but she prefers to stay on the medication at this time    We will discuss this again with her next visit

## 2018-02-26 NOTE — ASSESSMENT & PLAN NOTE
Atherosclerotic cardiovascular disease  Patient has a history of atherosclerotic cardiovascular disease status post CABG in the past   Patient continues to follow-up with cardiology and she continues to be stable with no anginal symptoms or changes from a cardiac standpoint  Patient will continue with present medication

## 2018-02-26 NOTE — PROGRESS NOTES
Assessment/Plan:    Benign hypertension with chronic kidney disease, stage III  Benign essential hypertension with chronic kidney disease stage 3  Patient continues to follow-up with her nephrologist and her kidney function has been stable  Patient will continue with present medication as ordered and they continue to follow up on her kidney function  Arteriosclerotic cardiovascular disease  Atherosclerotic cardiovascular disease  Patient has a history of atherosclerotic cardiovascular disease status post CABG in the past   Patient continues to follow-up with cardiology and she continues to be stable with no anginal symptoms or changes from a cardiac standpoint  Patient will continue with present medication  Paroxysmal atrial fibrillation (HCC)  Atrial fibrillation  Patient has a history of paroxysmal atrial fibrillation and she continues on oral anticoagulants and medications in order to control her heart rate  On examination today patient still has a loud aortic murmur and states she will be going in soon to be seen by her cardiologist for further evaluation and possible treatment  Depression  Depression  We did have a long discussion with the patient today about her depression  Patient states she has been on citalopram since the death of her  approximately 17 years ago  We did discuss with the patient weaning her off of the citalopram but she prefers to stay on the medication at this time  We will discuss this again with her next visit    Hyperlipidemia  Hyperlipidemia  Patient does have a history of elevated cholesterol levels, hyperlipidemia and she remains on a statin  She states she has been watching her diet and is eliminated the much as possible fats and cholesterol from her diet  She has a slip for routine labs including a lipid profile to be performed prior to an upcoming visit with Cardiology         Diagnoses and all orders for this visit:    Other iron deficiency anemia  - CBC and differential; Future    Arteriosclerotic cardiovascular disease    Benign hypertension with chronic kidney disease, stage III    Paroxysmal atrial fibrillation (HCC)    Reactive depression    Pure hypercholesterolemia    Other orders  -     amLODIPine (NORVASC) 5 mg tablet;   -     ELIQUIS 2 5 MG;   -     atorvastatin (LIPITOR) 10 mg tablet;   -     LUMIGAN 0 01 % ophthalmic drops;   -     cholecalciferol (VITAMIN D3) 1,000 units tablet; Take 2,000 Units by mouth  -     citalopram (CeleXA) 20 mg tablet; Take 1 tablet by mouth daily  -     cyanocobalamin 1000 MCG tablet; Take 1,000 mcg by mouth  -     dorzolamide-timolol (COSOPT) 22 3-6 8 MG/ML ophthalmic solution; Apply to eye  -     ferrous sulfate 325 (65 Fe) mg tablet;   -     Discontinue: omeprazole (PriLOSEC) 20 mg delayed release capsule; Take 1 capsule by mouth 2 (two) times a day          Subjective:      Patient ID: Lorenzo West is a 80 y o  female  Patient is a delightful 59-year-old female with a history of hyperlipidemia, coronary artery disease, paroxysmal atrial fibrillation, depression  Patient is here today for routine follow-up after a 4 month period of time  Patient states in general she has been doing well and was excited to tell me about her 89th birthday party which she states was wonderful  She does have an upcoming visit planned to be seen by our cardiologist for re-evaluation of her coronary artery disease and aortic stenosis  She denies any chest pain or pressure and no increasing shortness of breath with exertion  She states her appetite is good and she is eating healthy and she is having no bowel or bladder changes        The following portions of the patient's history were reviewed and updated as appropriate:   She  has no past medical history on file    She   Patient Active Problem List    Diagnosis Date Noted    Paroxysmal atrial fibrillation ([facility] [address] ) 09/05/2017    Benign hypertension with chronic kidney disease, stage III 06/09/2017    Hyperlipidemia 06/24/2013    Arteriosclerotic cardiovascular disease 11/12/2012    Depression 11/12/2012     She  has a past surgical history that includes Appendectomy; Reduction mammaplasty (Bilateral); Cardiac surgery; Replacement total knee; Neck surgery; and Cardiac pacemaker placement  Her family history includes Aneurysm in her brother; Other in her father  She  reports that she has never smoked  She does not have any smokeless tobacco history on file  She reports that she drinks alcohol  She reports that she does not use drugs  Current Outpatient Prescriptions   Medication Sig Dispense Refill    amLODIPine (NORVASC) 5 mg tablet   0    atenolol (TENORMIN) 50 mg tablet take 1 tablet by mouth once daily as directed 90 tablet 3    atorvastatin (LIPITOR) 10 mg tablet   0    cholecalciferol (VITAMIN D3) 1,000 units tablet Take 2,000 Units by mouth      citalopram (CeleXA) 20 mg tablet Take 1 tablet by mouth daily      cyanocobalamin 1000 MCG tablet Take 1,000 mcg by mouth      dorzolamide-timolol (COSOPT) 22 3-6 8 MG/ML ophthalmic solution Apply to eye      ELIQUIS 2 5 MG   1    ferrous sulfate 325 (65 Fe) mg tablet   0    LUMIGAN 0 01 % ophthalmic drops   0     No current facility-administered medications for this visit  Current Outpatient Prescriptions on File Prior to Visit   Medication Sig    atenolol (TENORMIN) 50 mg tablet take 1 tablet by mouth once daily as directed     No current facility-administered medications on file prior to visit  She has No Known Allergies       Review of Systems   Constitutional: Positive for unexpected weight change (As noted patient's weight is down approximately 13 pounds from her last visit  Patient states that she has been reducing her intake  Patient is to go for routine labs with Cardiology which would be extensive and we will be looking to review the results)   Negative for activity change, appetite change, chills, diaphoresis, fatigue and fever  HENT: Negative for congestion, dental problem, drooling, ear discharge, ear pain, facial swelling, hearing loss, mouth sores, nosebleeds, postnasal drip, rhinorrhea, sinus pain, sinus pressure, sneezing, sore throat, tinnitus and trouble swallowing  Eyes: Positive for visual disturbance ( some decreased visual acuity and she no longer drives)  Negative for photophobia, pain, discharge, redness and itching  Respiratory: Negative for apnea, cough, choking, chest tightness, shortness of breath and stridor  Cardiovascular: Positive for leg swelling ( patient states that she does have some swelling to the feet and ankles occasionally but not on a daily basis  )  Negative for chest pain  Gastrointestinal: Negative for abdominal distention, abdominal pain, anal bleeding, blood in stool, constipation, diarrhea, nausea, rectal pain and vomiting  Endocrine: Negative  Genitourinary: Negative  Musculoskeletal: Positive for arthralgias and gait problem  Negative for back pain, joint swelling, myalgias, neck pain and neck stiffness  Skin: Negative for color change, pallor, rash and wound  Allergic/Immunologic: Negative  Hematological: Negative  Psychiatric/Behavioral: Negative  Objective:      /68   Pulse (!) 53   Temp (!) 96 3 °F (35 7 °C)   Ht 5' 6" (1 676 m)   Wt 84 4 kg (186 lb)   SpO2 94%   BMI 30 02 kg/m²          Physical Exam   Constitutional: She is oriented to person, place, and time  She appears well-developed and well-nourished  No distress  Pleasant sure full 70-year-old female who is awake alert and looks much younger than her stated age  She is walking with a cane for stability   HENT:   Head: Normocephalic and atraumatic  Right Ear: External ear normal    Left Ear: External ear normal    Nose: Nose normal    Mouth/Throat: Oropharynx is clear and moist  No oropharyngeal exudate     Eyes: Conjunctivae and EOM are normal  Pupils are equal, round, and reactive to light  Right eye exhibits no discharge  Left eye exhibits no discharge  No scleral icterus  Neck: Normal range of motion  Neck supple  No JVD present  No tracheal deviation present  No thyromegaly present  Cardiovascular: Normal rate, regular rhythm, normal heart sounds and intact distal pulses  Exam reveals no gallop and no friction rub  No murmur heard  Pulmonary/Chest: Effort normal and breath sounds normal  No stridor  No respiratory distress  She has no wheezes  She has no rales  She exhibits no tenderness  Abdominal: Soft  Bowel sounds are normal  She exhibits no distension and no mass  There is no tenderness  There is no rebound and no guarding  Musculoskeletal: She exhibits deformity  She exhibits no edema or tenderness  Patient on examination has diffuse arthritic changes to the hands and digits, likewise some degenerative arthritic changes to both knees and some instability with gait and she walks with a cane for stability and safety  Lymphadenopathy:     She has no cervical adenopathy  Neurological: She is alert and oriented to person, place, and time  She has normal reflexes  She displays normal reflexes  No cranial nerve deficit  She exhibits normal muscle tone  Coordination normal    Skin: Skin is warm and dry  No rash noted  She is not diaphoretic  No erythema  No pallor  Psychiatric: She has a normal mood and affect  Her behavior is normal  Judgment and thought content normal    Nursing note and vitals reviewed

## 2018-02-26 NOTE — ASSESSMENT & PLAN NOTE
Hyperlipidemia  Patient does have a history of elevated cholesterol levels, hyperlipidemia and she remains on a statin  She states she has been watching her diet and is eliminated the much as possible fats and cholesterol from her diet  She has a slip for routine labs including a lipid profile to be performed prior to an upcoming visit with Cardiology

## 2018-02-26 NOTE — ASSESSMENT & PLAN NOTE
Benign essential hypertension with chronic kidney disease stage 3  Patient continues to follow-up with her nephrologist and her kidney function has been stable  Patient will continue with present medication as ordered and they continue to follow up on her kidney function

## 2018-02-26 NOTE — ASSESSMENT & PLAN NOTE
Atrial fibrillation  Patient has a history of paroxysmal atrial fibrillation and she continues on oral anticoagulants and medications in order to control her heart rate  On examination today patient still has a loud aortic murmur and states she will be going in soon to be seen by her cardiologist for further evaluation and possible treatment

## 2018-02-27 DIAGNOSIS — E78.5 HYPERLIPIDEMIA, UNSPECIFIED HYPERLIPIDEMIA TYPE: Primary | ICD-10-CM

## 2018-02-27 RX ORDER — ATORVASTATIN CALCIUM 10 MG/1
10 TABLET, FILM COATED ORAL DAILY
Qty: 90 TABLET | Refills: 3 | Status: SHIPPED | OUTPATIENT
Start: 2018-02-27 | End: 2019-04-11 | Stop reason: SDUPTHER

## 2018-03-20 DIAGNOSIS — D50.8 IRON DEFICIENCY ANEMIA SECONDARY TO INADEQUATE DIETARY IRON INTAKE: Primary | ICD-10-CM

## 2018-03-20 RX ORDER — FERROUS SULFATE 325(65) MG
TABLET ORAL
Qty: 30 TABLET | Refills: 2 | Status: SHIPPED | OUTPATIENT
Start: 2018-03-20 | End: 2018-09-20 | Stop reason: SDUPTHER

## 2018-04-26 ENCOUNTER — TELEPHONE (OUTPATIENT)
Dept: INTERNAL MEDICINE CLINIC | Facility: CLINIC | Age: 83
End: 2018-04-26

## 2018-04-26 NOTE — TELEPHONE ENCOUNTER
Patient called  She would like a refill for prilosec but she doesn't recall if you took her off of the med or not because she hasnt had it for a while    If she is supposed to be on it, please send it into [facility]

## 2018-06-18 ENCOUNTER — TELEPHONE (OUTPATIENT)
Dept: INTERNAL MEDICINE CLINIC | Facility: CLINIC | Age: 83
End: 2018-06-18

## 2018-06-18 DIAGNOSIS — R26.2 AMBULATORY DYSFUNCTION: Primary | ICD-10-CM

## 2018-06-18 NOTE — TELEPHONE ENCOUNTER
Patient's daughter called and said she took patient out yesterday but had a hard time and had to borrow a wheelchair from the [employer]  She is now looking to buy the patient a wheel chair but needs us to write up script  She did mention she has plans to take her out again tomorrow and would like to know if the script can be written and faxed to [facility] today, if possible      Script will need to say:    -Transport chair 19"  - Not able to propel herself in wheelchair  - Dx   - Caretaker: Kevin Moyer fax # [phone]    Daughter can be reached at [phone]

## 2018-06-23 LAB
BASOPHILS # BLD AUTO: 30 CELLS/UL (ref 0–200)
BASOPHILS NFR BLD AUTO: 0.8 %
EOSINOPHIL # BLD AUTO: 129 CELLS/UL (ref 15–500)
EOSINOPHIL NFR BLD AUTO: 3.4 %
ERYTHROCYTE [DISTWIDTH] IN BLOOD BY AUTOMATED COUNT: 12.6 % (ref 11–15)
HCT VFR BLD AUTO: 36.9 % (ref 35–45)
HGB BLD-MCNC: 11.6 G/DL (ref 11.7–15.5)
LYMPHOCYTES # BLD AUTO: 1205 CELLS/UL (ref 850–3900)
LYMPHOCYTES NFR BLD AUTO: 31.7 %
MCH RBC QN AUTO: 30.2 PG (ref 27–33)
MCHC RBC AUTO-ENTMCNC: 31.4 G/DL (ref 32–36)
MCV RBC AUTO: 96.1 FL (ref 80–100)
MONOCYTES # BLD AUTO: 448 CELLS/UL (ref 200–950)
MONOCYTES NFR BLD AUTO: 11.8 %
NEUTROPHILS # BLD AUTO: 1987 CELLS/UL (ref 1500–7800)
NEUTROPHILS NFR BLD AUTO: 52.3 %
PLATELET # BLD AUTO: 168 THOUSAND/UL (ref 140–400)
PMV BLD REES-ECKER: 11 FL (ref 7.5–12.5)
RBC # BLD AUTO: 3.84 MILLION/UL (ref 3.8–5.1)
WBC # BLD AUTO: 3.8 THOUSAND/UL (ref 3.8–10.8)

## 2018-06-28 ENCOUNTER — OFFICE VISIT (OUTPATIENT)
Dept: INTERNAL MEDICINE CLINIC | Facility: CLINIC | Age: 83
End: 2018-06-28
Payer: COMMERCIAL

## 2018-06-28 VITALS
OXYGEN SATURATION: 97 % | DIASTOLIC BLOOD PRESSURE: 78 MMHG | BODY MASS INDEX: 27.77 KG/M2 | HEIGHT: 66 IN | WEIGHT: 172.8 LBS | RESPIRATION RATE: 14 BRPM | TEMPERATURE: 97.6 F | HEART RATE: 64 BPM | SYSTOLIC BLOOD PRESSURE: 136 MMHG

## 2018-06-28 DIAGNOSIS — N18.30 BENIGN HYPERTENSION WITH CHRONIC KIDNEY DISEASE, STAGE III (HCC): Primary | ICD-10-CM

## 2018-06-28 DIAGNOSIS — F32.9 REACTIVE DEPRESSION: ICD-10-CM

## 2018-06-28 DIAGNOSIS — R63.4 RAPID WEIGHT LOSS: ICD-10-CM

## 2018-06-28 DIAGNOSIS — I48.0 PAROXYSMAL ATRIAL FIBRILLATION (HCC): ICD-10-CM

## 2018-06-28 DIAGNOSIS — I12.9 BENIGN HYPERTENSION WITH CHRONIC KIDNEY DISEASE, STAGE III (HCC): Primary | ICD-10-CM

## 2018-06-28 DIAGNOSIS — E78.00 PURE HYPERCHOLESTEROLEMIA: ICD-10-CM

## 2018-06-28 PROCEDURE — 3725F SCREEN DEPRESSION PERFORMED: CPT | Performed by: NURSE PRACTITIONER

## 2018-06-28 PROCEDURE — 1036F TOBACCO NON-USER: CPT | Performed by: NURSE PRACTITIONER

## 2018-06-28 PROCEDURE — 99214 OFFICE O/P EST MOD 30 MIN: CPT | Performed by: NURSE PRACTITIONER

## 2018-06-28 RX ORDER — OMEPRAZOLE 20 MG/1
20 CAPSULE, DELAYED RELEASE ORAL DAILY
COMMUNITY

## 2018-06-28 RX ORDER — ASPIRIN 81 MG/1
81 TABLET ORAL DAILY
COMMUNITY

## 2018-06-28 NOTE — ASSESSMENT & PLAN NOTE
Pt is interested in decreasing how many medications she takes and we discussed weaning off her citalopram   She has been on this for 17 years and patient does not feel she needs it  PHQ is 0 today  We did discuss that the medication can not be abruptly stopped and she must decrease her dose over time  I advised that she should begin by cutting her 20 mg tablets in half and taking 10 mg daily  After about a week if she remains symptom free she can begin to take a half a tablet every other day for about a week  If still symptom free she can take a half tablet every 2-3 days for about a week then stop  I told her that if at any point during this process she begins to feel her mood begin to decline she can resume her 20 mg dose  She will return for f/u in one week to evaluate

## 2018-06-28 NOTE — ASSESSMENT & PLAN NOTE
14 lb weight loss over the past 4 months  Pt does have a decreased appetite and is very inactive  She does not have any other symptoms and her CBC is unremarkable  She will return in one month for follow up and will assess for continued weight loss and need for further evaluation or intervention

## 2018-06-28 NOTE — ASSESSMENT & PLAN NOTE
Pt taking eliquis daily  No symptoms of palpitations or dizziness reported  Pt does see cardiology for regular follow up as well

## 2018-06-28 NOTE — PROGRESS NOTES
Assessment/Plan:    Benign hypertension with chronic kidney disease, stage III  Blood pressure shows good control on current medications  No change indicated  CKD has been stable, will continue to monitor with blood work  Paroxysmal atrial fibrillation (HCC)  Pt taking eliquis daily  No symptoms of palpitations or dizziness reported  Pt does see cardiology for regular follow up as well  Depression  Pt is interested in decreasing how many medications she takes and we discussed weaning off her citalopram   She has been on this for 17 years and patient does not feel she needs it  PHQ is 0 today  We did discuss that the medication can not be abruptly stopped and she must decrease her dose over time  I advised that she should begin by cutting her 20 mg tablets in half and taking 10 mg daily  After about a week if she remains symptom free she can begin to take a half a tablet every other day for about a week  If still symptom free she can take a half tablet every 2-3 days for about a week then stop  I told her that if at any point during this process she begins to feel her mood begin to decline she can resume her 20 mg dose  She will return for f/u in one week to evaluate  Hyperlipidemia  Stable on statin therapy and aspirin  Rapid weight loss  14 lb weight loss over the past 4 months  Pt does have a decreased appetite and is very inactive  She does not have any other symptoms and her CBC is unremarkable  She will return in one month for follow up and will assess for continued weight loss and need for further evaluation or intervention  Diagnoses and all orders for this visit:    Benign hypertension with chronic kidney disease, stage III    Paroxysmal atrial fibrillation (HCC)    Reactive depression    Pure hypercholesterolemia    Rapid weight loss    Other orders  -     omeprazole (PriLOSEC) 20 mg delayed release capsule;  Take 20 mg by mouth daily  -     aspirin (ECOTRIN LOW STRENGTH) 81 mg EC tablet; Take 81 mg by mouth daily          Subjective:      Patient ID: Jose Escobar is a 80 y o  female  Pt is a 80y o  year old female who is seen today for routine follow up to management of HTN, CVD, afib, and depression  Pt reports that she is compliant with her medications  She states she has been well but when I pointed out that she has lost 14 pounds since February she does note that the past few months she has not had much of an appetite  She denies nausea, vomiting, abdominal pain, bowel movements are regular, no diarrhea or constipation but she states she is eating very little, perhaps one meal per day  She says she spends a lot of the day watching TV  Pt denies fever/chills, night sweats  Pt denies chest pain, palpitations, shortness of breath, headache, dizziness, numbness, tingling  Recent CBC reveals slight improvement in Hg to 11 6 and is otherwise unremarkable  The following portions of the patient's history were reviewed and updated as appropriate: allergies, current medications, past family history, past medical history, past social history, past surgical history and problem list     Review of Systems   Constitutional: Positive for appetite change and unexpected weight change  Negative for activity change, chills, fatigue and fever  HENT: Negative for hearing loss  Eyes: Negative for visual disturbance  Respiratory: Negative for cough, chest tightness and shortness of breath  Cardiovascular: Negative for chest pain, palpitations and leg swelling  Gastrointestinal: Negative for constipation, diarrhea, nausea and vomiting  Genitourinary: Negative for dysuria and frequency  Musculoskeletal: Negative for arthralgias and myalgias  Neurological: Negative for dizziness, weakness, numbness and headaches  Psychiatric/Behavioral: Negative for decreased concentration, dysphoric mood and sleep disturbance  The patient is not nervous/anxious  Objective:      /78 (BP Location: Left arm, Patient Position: Sitting, Cuff Size: Large)   Pulse 64   Temp 97 6 °F (36 4 °C)   Resp 14   Ht 5' 6" (1 676 m)   Wt 78 4 kg (172 lb 12 8 oz)   SpO2 97%   BMI 27 89 kg/m²          Physical Exam   Constitutional: She is oriented to person, place, and time  Vital signs are normal  She appears well-developed and well-nourished  She is cooperative  HENT:   Head: Normocephalic  Right Ear: Hearing, tympanic membrane, external ear and ear canal normal    Left Ear: Hearing, tympanic membrane, external ear and ear canal normal    Nose: Nose normal  No mucosal edema  Mouth/Throat: Uvula is midline, oropharynx is clear and moist and mucous membranes are normal    Eyes: Conjunctivae and lids are normal  Pupils are equal, round, and reactive to light  Left lit droops   Neck: No JVD present  Carotid bruit is not present  No thyromegaly present  Cardiovascular: Normal rate, regular rhythm and intact distal pulses  Murmur heard  Systolic murmur is present with a grade of 4/6   Pulmonary/Chest: Effort normal and breath sounds normal  No respiratory distress  Abdominal: Soft  Normal appearance and bowel sounds are normal  There is no tenderness  Musculoskeletal: Normal range of motion  She exhibits no edema  Lymphadenopathy:     She has no cervical adenopathy  Neurological: She is alert and oriented to person, place, and time  She has normal strength  Skin: Skin is warm, dry and intact  Psychiatric: She has a normal mood and affect  Her speech is normal and behavior is normal  Judgment and thought content normal  Cognition and memory are normal    Vitals reviewed

## 2018-06-28 NOTE — ASSESSMENT & PLAN NOTE
Blood pressure shows good control on current medications  No change indicated  CKD has been stable, will continue to monitor with blood work

## 2018-07-06 ENCOUNTER — TELEPHONE (OUTPATIENT)
Dept: INTERNAL MEDICINE CLINIC | Facility: CLINIC | Age: 83
End: 2018-07-06

## 2018-08-01 ENCOUNTER — OFFICE VISIT (OUTPATIENT)
Dept: INTERNAL MEDICINE CLINIC | Facility: CLINIC | Age: 83
End: 2018-08-01
Payer: COMMERCIAL

## 2018-08-01 VITALS
BODY MASS INDEX: 30.05 KG/M2 | SYSTOLIC BLOOD PRESSURE: 134 MMHG | OXYGEN SATURATION: 98 % | HEART RATE: 57 BPM | DIASTOLIC BLOOD PRESSURE: 76 MMHG | HEIGHT: 66 IN | WEIGHT: 187 LBS | TEMPERATURE: 97.3 F

## 2018-08-01 DIAGNOSIS — E78.00 PURE HYPERCHOLESTEROLEMIA: ICD-10-CM

## 2018-08-01 DIAGNOSIS — I48.0 PAROXYSMAL ATRIAL FIBRILLATION (HCC): ICD-10-CM

## 2018-08-01 DIAGNOSIS — F32.9 REACTIVE DEPRESSION: ICD-10-CM

## 2018-08-01 DIAGNOSIS — R63.4 RAPID WEIGHT LOSS: Primary | ICD-10-CM

## 2018-08-01 DIAGNOSIS — I25.10 ARTERIOSCLEROTIC CARDIOVASCULAR DISEASE: ICD-10-CM

## 2018-08-01 PROCEDURE — 99214 OFFICE O/P EST MOD 30 MIN: CPT | Performed by: INTERNAL MEDICINE

## 2018-08-01 PROCEDURE — 1160F RVW MEDS BY RX/DR IN RCRD: CPT | Performed by: INTERNAL MEDICINE

## 2018-08-01 NOTE — PROGRESS NOTES
Assessment/Plan:    Rapid weight loss  Patient is being seen today with some significant weight loss that was recorded with her last visit going from 186 lb to down to 172 lb  With recheck of her weight today she is now back up to 187 lb and states that is there has been no change in her appetite  I did apologize to the patient and told her that the most likely reason for that changes is inaccurate reading taken with her last visit  Patient was happy to hear the news that she is stable  Depression  Depression  Patient has longstanding history of depression was placed on medication after she had an acute coronary event and bypass surgery  Patient wished to be taken off the medication and has weaned herself down to 5 mg of citalopram daily  Patient was told that she can take this amount every other day for 2 weeks and then stop the medication but to call if any problems with her emotional state  Patient understands and agrees  Paroxysmal atrial fibrillation (HCC)  Atrial fibrillation  Patient's heart rate is controlled and is regular today when checked in the office  Patient remains on Eliquis and follow up with Cardiology  Arteriosclerotic cardiovascular disease  Atherosclerotic cardiovascular disease, history of acute myocardial infarction in the past status post bypass surgery  Patient continues to follow-up with cardiology and she is asymptomatic with no chest pain or pressure and no shortness of breath with exertion  We will continue to monitor her cholesterol and make adjustments to medication if needed in the future  Diagnoses and all orders for this visit:    Rapid weight loss    Reactive depression    Paroxysmal atrial fibrillation ([facility] [address] )    Pure hypercholesterolemia  -     Lipid panel; Future    Arteriosclerotic cardiovascular disease  -     Lipid panel; Future          Subjective:      Patient ID: Shawn Saravia is a 80 y o  female      Patient is a 79-year-old female with a history of multiple medical problems including atherosclerotic cardiovascular disease status post bypass surgery in the past, history of hyperlipidemia, anxiety and depression  Patient is here today after 2 week period time  Recent studies evaluation in the office showed a significant and acute weight loss  Patient is also trying to wean herself off her antidepressants  With a re-evaluation today patient's weight has miraculously come back up to her baseline without any changes in her diet or appetite  She is successfully reducing her doses citalopram with no major emotional concerns  Patient has no new complaints or concerns        The following portions of the patient's history were reviewed and updated as appropriate:   She  has no past medical history on file  She   Patient Active Problem List    Diagnosis Date Noted    Rapid weight loss 06/28/2018    Paroxysmal atrial fibrillation ([facility] [address] ) 09/05/2017    Benign hypertension with chronic kidney disease, stage III 06/09/2017    Hyperlipidemia 06/24/2013    Arteriosclerotic cardiovascular disease 11/12/2012    Depression 11/12/2012     She  has a past surgical history that includes Appendectomy; Reduction mammaplasty (Bilateral); Cardiac surgery; Replacement total knee; Neck surgery; and Cardiac pacemaker placement  Her family history includes Aneurysm in her brother; Other in her father  She  reports that she has never smoked  She does not have any smokeless tobacco history on file  She reports that she drinks alcohol  She reports that she does not use drugs    Current Outpatient Prescriptions   Medication Sig Dispense Refill    amLODIPine (NORVASC) 5 mg tablet   0    aspirin (ECOTRIN LOW STRENGTH) 81 mg EC tablet Take 81 mg by mouth daily      atenolol (TENORMIN) 50 mg tablet take 1 tablet by mouth once daily as directed 90 tablet 3    atorvastatin (LIPITOR) 10 mg tablet Take 1 tablet (10 mg total) by mouth daily 90 tablet 3    cholecalciferol (VITAMIN D3) 1,000 units tablet Take 2,000 Units by mouth      citalopram (CeleXA) 20 mg tablet Take 1 tablet by mouth daily      cyanocobalamin 1000 MCG tablet Take 1,000 mcg by mouth      dorzolamide-timolol (COSOPT) 22 3-6 8 MG/ML ophthalmic solution Apply to eye      ELIQUIS 2 5 MG   1    ferrous sulfate 325 (65 Fe) mg tablet take 1 tablet by mouth once daily 30 tablet 2    LUMIGAN 0 01 % ophthalmic drops   0    Misc  Devices (TRANSPORT CHAIR) MISC by Does not apply route as needed (Lower extremity weakness) Patient does have a history of degenerative arthritis and she does have problems with lower extremity weakness, via ambulatory dysfunction  Patient also has a history of coronary artery disease and is not able 1 each 0    omeprazole (PriLOSEC) 20 mg delayed release capsule Take 20 mg by mouth daily       No current facility-administered medications for this visit  Current Outpatient Prescriptions on File Prior to Visit   Medication Sig    amLODIPine (NORVASC) 5 mg tablet     aspirin (ECOTRIN LOW STRENGTH) 81 mg EC tablet Take 81 mg by mouth daily    atenolol (TENORMIN) 50 mg tablet take 1 tablet by mouth once daily as directed    atorvastatin (LIPITOR) 10 mg tablet Take 1 tablet (10 mg total) by mouth daily    cholecalciferol (VITAMIN D3) 1,000 units tablet Take 2,000 Units by mouth    citalopram (CeleXA) 20 mg tablet Take 1 tablet by mouth daily    cyanocobalamin 1000 MCG tablet Take 1,000 mcg by mouth    dorzolamide-timolol (COSOPT) 22 3-6 8 MG/ML ophthalmic solution Apply to eye    ELIQUIS 2 5 MG     ferrous sulfate 325 (65 Fe) mg tablet take 1 tablet by mouth once daily    LUMIGAN 0 01 % ophthalmic drops     Misc  Devices (TRANSPORT CHAIR) MISC by Does not apply route as needed (Lower extremity weakness) Patient does have a history of degenerative arthritis and she does have problems with lower extremity weakness, via ambulatory dysfunction    Patient also has a history of coronary artery disease and is not able    omeprazole (PriLOSEC) 20 mg delayed release capsule Take 20 mg by mouth daily     No current facility-administered medications on file prior to visit  She has No Known Allergies       Review of Systems   Constitutional: Negative  HENT: Negative  Eyes: Negative  Respiratory: Negative  Cardiovascular: Negative  Gastrointestinal: Negative  Endocrine: Negative  Genitourinary: Negative  Musculoskeletal: Positive for arthralgias and gait problem  Negative for back pain, joint swelling, myalgias, neck pain and neck stiffness  Skin: Negative  Allergic/Immunologic: Negative  Neurological: Negative for dizziness, tremors, seizures, syncope, facial asymmetry, speech difficulty, weakness, light-headedness, numbness and headaches  Hematological: Negative  Psychiatric/Behavioral: Negative for agitation, behavioral problems, confusion, decreased concentration, dysphoric mood, hallucinations, self-injury, sleep disturbance and suicidal ideas  The patient is nervous/anxious  The patient is not hyperactive  Objective:      /76   Pulse 57   Temp (!) 97 3 °F (36 3 °C)   Ht 5' 6" (1 676 m)   Wt 84 8 kg (187 lb)   SpO2 98%   BMI 30 18 kg/m²          Physical Exam   Constitutional: She is oriented to person, place, and time  She appears well-developed and well-nourished  No distress  Pleasant, elderly female who is awake alert no acute distress and oriented x3, walking with a cane   HENT:   Head: Normocephalic and atraumatic  Right Ear: External ear normal    Left Ear: External ear normal    Nose: Nose normal    Mouth/Throat: Oropharynx is clear and moist  No oropharyngeal exudate  Eyes: Conjunctivae and EOM are normal  Pupils are equal, round, and reactive to light  Right eye exhibits no discharge  Left eye exhibits no discharge  No scleral icterus  Neck: Normal range of motion  Neck supple  No JVD present  No tracheal deviation present   No thyromegaly present  Cardiovascular: Normal rate, regular rhythm, normal heart sounds and intact distal pulses  Exam reveals no gallop and no friction rub  No murmur heard  Pulmonary/Chest: Effort normal and breath sounds normal  No stridor  No respiratory distress  She has no wheezes  She has no rales  She exhibits no tenderness  Abdominal: Soft  Bowel sounds are normal  She exhibits no distension and no mass  There is no tenderness  There is no rebound and no guarding  Musculoskeletal: She exhibits edema and deformity  She exhibits no tenderness  Patient has some diffuse arthritic abnormalities but nothing acute or disabling at this time or change from previous  Patient still walks with a cane for stability  Occasionally gets trace edema to her lower extremities but no edema was noted on exam today   Lymphadenopathy:     She has no cervical adenopathy  Neurological: She is alert and oriented to person, place, and time  She has normal reflexes  She displays normal reflexes  No cranial nerve deficit  She exhibits normal muscle tone  Coordination normal    Skin: Skin is warm and dry  No rash noted  She is not diaphoretic  No erythema  No pallor  Psychiatric: She has a normal mood and affect  Her behavior is normal  Judgment and thought content normal    Nursing note and vitals reviewed

## 2018-08-01 NOTE — ASSESSMENT & PLAN NOTE
Depression  Patient has longstanding history of depression was placed on medication after she had an acute coronary event and bypass surgery  Patient wished to be taken off the medication and has weaned herself down to 5 mg of citalopram daily  Patient was told that she can take this amount every other day for 2 weeks and then stop the medication but to call if any problems with her emotional state  Patient understands and agrees

## 2018-08-01 NOTE — ASSESSMENT & PLAN NOTE
Atrial fibrillation  Patient's heart rate is controlled and is regular today when checked in the office  Patient remains on Eliquis and follow up with Cardiology

## 2018-08-01 NOTE — ASSESSMENT & PLAN NOTE
Patient is being seen today with some significant weight loss that was recorded with her last visit going from 186 lb to down to 172 lb  With recheck of her weight today she is now back up to 187 lb and states that is there has been no change in her appetite  I did apologize to the patient and told her that the most likely reason for that changes is inaccurate reading taken with her last visit  Patient was happy to hear the news that she is stable

## 2018-08-01 NOTE — ASSESSMENT & PLAN NOTE
Atherosclerotic cardiovascular disease, history of acute myocardial infarction in the past status post bypass surgery  Patient continues to follow-up with cardiology and she is asymptomatic with no chest pain or pressure and no shortness of breath with exertion  We will continue to monitor her cholesterol and make adjustments to medication if needed in the future

## 2018-09-20 DIAGNOSIS — D50.8 IRON DEFICIENCY ANEMIA SECONDARY TO INADEQUATE DIETARY IRON INTAKE: ICD-10-CM

## 2018-09-20 RX ORDER — FERROUS SULFATE 325(65) MG
TABLET ORAL
Qty: 30 TABLET | Refills: 2 | Status: SHIPPED | OUTPATIENT
Start: 2018-09-20 | End: 2018-11-13 | Stop reason: SDUPTHER

## 2018-10-24 ENCOUNTER — TELEPHONE (OUTPATIENT)
Dept: INTERNAL MEDICINE CLINIC | Facility: CLINIC | Age: 83
End: 2018-10-24

## 2018-10-24 NOTE — TELEPHONE ENCOUNTER
July Villaseñor from [facility] was looking for last office note since Pt receives supplies from them  I have faxed OV note 8/1/18 to #[phone]

## 2018-11-13 DIAGNOSIS — D50.8 IRON DEFICIENCY ANEMIA SECONDARY TO INADEQUATE DIETARY IRON INTAKE: ICD-10-CM

## 2018-11-13 RX ORDER — FERROUS SULFATE 325(65) MG
1 TABLET ORAL DAILY
Qty: 30 TABLET | Refills: 5 | Status: SHIPPED | OUTPATIENT
Start: 2018-11-13 | End: 2019-02-22 | Stop reason: SDUPTHER

## 2018-11-27 DIAGNOSIS — I10 ESSENTIAL HYPERTENSION: Primary | ICD-10-CM

## 2018-11-27 RX ORDER — AMLODIPINE BESYLATE 5 MG/1
5 TABLET ORAL DAILY
Qty: 90 TABLET | Refills: 3 | Status: SHIPPED | OUTPATIENT
Start: 2018-11-27 | End: 2018-12-14 | Stop reason: SDUPTHER

## 2018-12-05 ENCOUNTER — OFFICE VISIT (OUTPATIENT)
Dept: INTERNAL MEDICINE CLINIC | Facility: CLINIC | Age: 83
End: 2018-12-05
Payer: COMMERCIAL

## 2018-12-05 VITALS
BODY MASS INDEX: 29.73 KG/M2 | HEART RATE: 80 BPM | DIASTOLIC BLOOD PRESSURE: 80 MMHG | TEMPERATURE: 96 F | WEIGHT: 185 LBS | HEIGHT: 66 IN | OXYGEN SATURATION: 98 % | SYSTOLIC BLOOD PRESSURE: 138 MMHG

## 2018-12-05 DIAGNOSIS — N18.30 BENIGN HYPERTENSION WITH CHRONIC KIDNEY DISEASE, STAGE III (HCC): ICD-10-CM

## 2018-12-05 DIAGNOSIS — I25.10 ARTERIOSCLEROTIC CARDIOVASCULAR DISEASE: ICD-10-CM

## 2018-12-05 DIAGNOSIS — I10 ESSENTIAL HYPERTENSION: Primary | ICD-10-CM

## 2018-12-05 DIAGNOSIS — F32.9 REACTIVE DEPRESSION: ICD-10-CM

## 2018-12-05 DIAGNOSIS — I12.9 BENIGN HYPERTENSION WITH CHRONIC KIDNEY DISEASE, STAGE III (HCC): ICD-10-CM

## 2018-12-05 DIAGNOSIS — D64.9 ANEMIA, UNSPECIFIED TYPE: ICD-10-CM

## 2018-12-05 DIAGNOSIS — E78.00 PURE HYPERCHOLESTEROLEMIA: ICD-10-CM

## 2018-12-05 DIAGNOSIS — I48.0 PAROXYSMAL ATRIAL FIBRILLATION (HCC): ICD-10-CM

## 2018-12-05 DIAGNOSIS — R63.4 RAPID WEIGHT LOSS: ICD-10-CM

## 2018-12-05 PROCEDURE — 99214 OFFICE O/P EST MOD 30 MIN: CPT | Performed by: INTERNAL MEDICINE

## 2018-12-05 PROCEDURE — 1160F RVW MEDS BY RX/DR IN RCRD: CPT | Performed by: INTERNAL MEDICINE

## 2018-12-05 NOTE — PROGRESS NOTES
Assessment/Plan:    Arteriosclerotic cardiovascular disease  Patient continues to follow-up with her cardiologist   She has routine pacemaker test performed  She remains on anticoagulants specifically Eliquis  We did review with her labs were performed by the cardiologist in September  There were no acute abnormalities  Patient denies any chest pain or pressure and no increasing shortness of breath  On auscultation patient does have and aortic valvular murmur which is unchanged  Benign hypertension with chronic kidney disease, stage III  Patient's blood pressure is showing excellent control with present treatment  We did also look into the patient's renal function which is stable  Patient will continue with present treatment and evaluation  We will be checking on her renal function with her next visit  Patient states that she does not like drinking water but is trying to keep herself well hydrated    Paroxysmal atrial fibrillation ([address] )  Patient's heart rate is control  Patient does have a pacemaker  Patient remains on oral anticoagulants  She denies any palpitations of the heart, no increasing shortness of breath or swelling to the feet and ankles    Depression  Patient has been on Celexa 20 mg a day long-term  Patient who is accompanied by the daughter today agrees that her mental status is stable and she has no signs of anxiety depression  We did discuss with the patient and her daughter stopping the medication or reducing her dose but she feels comfortable the way she is  We will continue present treatment    Rapid weight loss  Patient's weight is now back to baseline  She has no difficulties with her appetite  We will continue present surveillance  Hyperlipidemia  Patient has a history of hyperlipidemia, coronary artery disease  She did have a lipid profile performed with her cardiologist in September  Patient's cholesterol is stable    She admits that she is not always perfect with her diet   We did discuss with her the importance of watching her intake of fats and cholesterol with her diet       Diagnoses and all orders for this visit:    Essential hypertension  -     Basic metabolic panel; Future    Anemia, unspecified type  -     CBC and differential; Future    Arteriosclerotic cardiovascular disease    Benign hypertension with chronic kidney disease, stage III (HCC)    Reactive depression    Paroxysmal atrial fibrillation (HCC)    Rapid weight loss    Pure hypercholesterolemia          Subjective:      Patient ID: Catrina Beverly is a 80 y o  female  Patient is a very pleasant 80-year-old female with a history of multiple medical problems as outlined previously  Patient is here today for routine follow-up  Patient states that she is feeling well and enjoys spending time with her family  She did have recent accidental injury at home while working in the kitchen and fell and hurt her left knee  She states it was swollen for few days but now has had no problems or complaints  This is the area of right total knee replacement previously on the left  She denies any chest pain or pressure and no increasing shortness of breath with exertion  She is accompanied today by her daughter  She we did review an appointment that she had with her cardiologist in September        The following portions of the patient's history were reviewed and updated as appropriate:   She  has no past medical history on file  She   Patient Active Problem List    Diagnosis Date Noted    Rapid weight loss 06/28/2018    [facility] 09/05/2017    Benign hypertension with chronic kidney disease, stage III ([facility] [address] ) 06/09/2017    Hyperlipidemia 06/24/2013    Arteriosclerotic cardiovascular disease 11/12/2012    Depression 11/12/2012     She  has a past surgical history that includes Appendectomy; Reduction mammaplasty (Bilateral);  Cardiac surgery; Replacement total knee; Neck surgery; and Cardiac pacemaker placement  Her family history includes Aneurysm in her brother; Other in her father  She  reports that she has never smoked  She has never used smokeless tobacco  She reports that she drinks alcohol  She reports that she does not use drugs  Current Outpatient Prescriptions   Medication Sig Dispense Refill    amLODIPine (NORVASC) 5 mg tablet Take 1 tablet (5 mg total) by mouth daily 90 tablet 3    aspirin (ECOTRIN LOW STRENGTH) 81 mg EC tablet Take 81 mg by mouth daily      atenolol (TENORMIN) 50 mg tablet take 1 tablet by mouth once daily as directed 90 tablet 3    atorvastatin (LIPITOR) 10 mg tablet Take 1 tablet (10 mg total) by mouth daily 90 tablet 3    cholecalciferol (VITAMIN D3) 1,000 units tablet Take 2,000 Units by mouth      citalopram (CeleXA) 20 mg tablet Take 1 tablet by mouth daily      cyanocobalamin 1000 MCG tablet Take 1,000 mcg by mouth      dorzolamide-timolol (COSOPT) 22 3-6 8 MG/ML ophthalmic solution Apply to eye      ELIQUIS 2 5 MG   1    ferrous sulfate (RA IRON) 325 (65 Fe) mg tablet Take 1 tablet (325 mg total) by mouth daily 30 tablet 5    LUMIGAN 0 01 % ophthalmic drops   0    Misc  Devices (TRANSPORT CHAIR) MISC by Does not apply route as needed (Lower extremity weakness) Patient does have a history of degenerative arthritis and she does have problems with lower extremity weakness, via ambulatory dysfunction  Patient also has a history of coronary artery disease and is not able 1 each 0    omeprazole (PriLOSEC) 20 mg delayed release capsule Take 20 mg by mouth daily       No current facility-administered medications for this visit        Current Outpatient Prescriptions on File Prior to Visit   Medication Sig    amLODIPine (NORVASC) 5 mg tablet Take 1 tablet (5 mg total) by mouth daily    aspirin (ECOTRIN LOW STRENGTH) 81 mg EC tablet Take 81 mg by mouth daily    atenolol (TENORMIN) 50 mg tablet take 1 tablet by mouth once daily as directed    atorvastatin (LIPITOR) 10 mg tablet Take 1 tablet (10 mg total) by mouth daily    cholecalciferol (VITAMIN D3) 1,000 units tablet Take 2,000 Units by mouth    citalopram (CeleXA) 20 mg tablet Take 1 tablet by mouth daily    cyanocobalamin 1000 MCG tablet Take 1,000 mcg by mouth    dorzolamide-timolol (COSOPT) 22 3-6 8 MG/ML ophthalmic solution Apply to eye    ELIQUIS 2 5 MG     ferrous sulfate (RA IRON) 325 (65 Fe) mg tablet Take 1 tablet (325 mg total) by mouth daily    LUMIGAN 0 01 % ophthalmic drops     Misc  Devices (TRANSPORT CHAIR) MISC by Does not apply route as needed (Lower extremity weakness) Patient does have a history of degenerative arthritis and she does have problems with lower extremity weakness, via ambulatory dysfunction  Patient also has a history of coronary artery disease and is not able    omeprazole (PriLOSEC) 20 mg delayed release capsule Take 20 mg by mouth daily     No current facility-administered medications on file prior to visit  She has No Known Allergies       Review of Systems   Constitutional: Negative  HENT: Negative  Eyes: Negative  Respiratory: Negative  Cardiovascular: Negative  Gastrointestinal: Negative  Endocrine: Negative  Genitourinary: Negative  Musculoskeletal: Positive for arthralgias and gait problem  Negative for back pain, joint swelling, myalgias, neck pain and neck stiffness  Skin: Negative  Allergic/Immunologic: Negative  Hematological: Negative  Psychiatric/Behavioral: Negative for agitation, behavioral problems, confusion, decreased concentration, dysphoric mood, hallucinations, self-injury, sleep disturbance and suicidal ideas  The patient is nervous/anxious  The patient is not hyperactive            Objective:      /80   Pulse 80   Temp (!) 96 °F (35 6 °C)   Ht 5' 6" (1 676 m)   Wt 83 9 kg (185 lb)   SpO2 98%   BMI 29 86 kg/m²          Physical Exam   Constitutional: She is oriented to person, place, and time  She appears well-developed and well-nourished  No distress  Pleasant, cheerful well dressed 80-year-old female who is awake alert in no acute distress and oriented x3   HENT:   Head: Normocephalic and atraumatic  Right Ear: External ear normal    Left Ear: External ear normal    Nose: Nose normal    Mouth/Throat: Oropharynx is clear and moist  No oropharyngeal exudate  Eyes: Pupils are equal, round, and reactive to light  Conjunctivae and EOM are normal  Right eye exhibits no discharge  Left eye exhibits no discharge  No scleral icterus  Neck: Normal range of motion  Neck supple  No JVD present  No tracheal deviation present  No thyromegaly present  Cardiovascular: Normal rate, regular rhythm and intact distal pulses  Exam reveals no gallop and no friction rub  Murmur heard  Pulmonary/Chest: Effort normal and breath sounds normal  No stridor  No respiratory distress  She has no wheezes  She has no rales  She exhibits no tenderness  Abdominal: Soft  Bowel sounds are normal  She exhibits no distension and no mass  There is no tenderness  There is no rebound and no guarding  Musculoskeletal: Normal range of motion  She exhibits no edema, tenderness or deformity  Evaluation of her left knee after recent accident shows no effusion, no significant deformity other from previous total knee replacement previously  Patient has no effusion and no tenderness to palpation or movement   Lymphadenopathy:     She has no cervical adenopathy  Neurological: She is alert and oriented to person, place, and time  She displays abnormal reflex  No cranial nerve deficit  She exhibits normal muscle tone  Coordination (Walking with a cane, difficulty slightly with transfer but no pain with movement) abnormal    Patient has lack of deep tendon reflexes both knees and patella tendons   Skin: Skin is warm and dry  No rash noted  She is not diaphoretic  No erythema  No pallor     Psychiatric: She has a normal mood and affect  Her behavior is normal  Judgment and thought content normal    Nursing note and vitals reviewed  BMI Counseling: Body mass index is 29 86 kg/m²  Discussed the patient's BMI with her  The BMI is above average  BMI counseling and education was provided to the patient  Nutrition recommendations include reducing portion sizes, decreasing overall calorie intake, 3-5 servings of fruits/vegetables daily, reducing fast food intake, consuming healthier snacks, moderation in carbohydrate intake, increasing intake of lean protein and reducing intake of saturated fat and trans fat  BMI Counseling: Body mass index is 29 86 kg/m²  Discussed the patient's BMI with her  The BMI is above average  BMI counseling and education was provided to the patient  Nutrition recommendations include reducing portion sizes, decreasing overall calorie intake, 3-5 servings of fruits/vegetables daily, reducing fast food intake, moderation in carbohydrate intake and increasing intake of lean protein

## 2018-12-05 NOTE — ASSESSMENT & PLAN NOTE
Patient has a history of hyperlipidemia, coronary artery disease  She did have a lipid profile performed with her cardiologist in September  Patient's cholesterol is stable  She admits that she is not always perfect with her diet    We did discuss with her the importance of watching her intake of fats and cholesterol with her diet

## 2018-12-05 NOTE — ASSESSMENT & PLAN NOTE
Patient continues to follow-up with her cardiologist   She has routine pacemaker test performed  She remains on anticoagulants specifically Eliquis  We did review with her labs were performed by the cardiologist in September  There were no acute abnormalities  Patient denies any chest pain or pressure and no increasing shortness of breath  On auscultation patient does have and aortic valvular murmur which is unchanged

## 2018-12-05 NOTE — ASSESSMENT & PLAN NOTE
Patient's weight is now back to baseline  She has no difficulties with her appetite  We will continue present surveillance

## 2018-12-05 NOTE — ASSESSMENT & PLAN NOTE
Patient's heart rate is control  Patient does have a pacemaker  Patient remains on oral anticoagulants    She denies any palpitations of the heart, no increasing shortness of breath or swelling to the feet and ankles

## 2018-12-05 NOTE — ASSESSMENT & PLAN NOTE
Patient's blood pressure is showing excellent control with present treatment  We did also look into the patient's renal function which is stable  Patient will continue with present treatment and evaluation  We will be checking on her renal function with her next visit    Patient states that she does not like drinking water but is trying to keep herself well hydrated

## 2018-12-05 NOTE — ASSESSMENT & PLAN NOTE
Patient has been on Celexa 20 mg a day long-term  Patient who is accompanied by the daughter today agrees that her mental status is stable and she has no signs of anxiety depression  We did discuss with the patient and her daughter stopping the medication or reducing her dose but she feels comfortable the way she is    We will continue present treatment

## 2018-12-05 NOTE — PATIENT INSTRUCTIONS
Low Fat Diet   AMBULATORY CARE:   A low-fat diet  is an eating plan that is low in total fat, unhealthy fat, and cholesterol  You may need to follow a low-fat diet if you have trouble digesting or absorbing fat  You may also need to follow this diet if you have high cholesterol  You can also lower your cholesterol by increasing the amount of fiber in your diet  Soluble fiber is a type of fiber that helps to decrease cholesterol levels  Different types of fat in food:   · Limit unhealthy fats  A diet that is high in cholesterol, saturated fat, and trans fat may cause unhealthy cholesterol levels  Unhealthy cholesterol levels increase your risk of heart disease  ¨ Cholesterol:  Limit intake of cholesterol to less than 200 mg per day  Cholesterol is found in meat, eggs, and dairy  ¨ Saturated fat:  Limit saturated fat to less than 7% of your total daily calories  Ask your dietitian how many calories you need each day  Saturated fat is found in butter, cheese, ice cream, whole milk, and palm oil  Saturated fat is also found in meat, such as beef, pork, chicken skin, and processed meats  Processed meats include sausage, hot dogs, and bologna  ¨ Trans fat:  Avoid trans fat as much as possible  Trans fat is used in fried and baked foods  Foods that say trans fat free on the label may still have up to 0 5 grams of trans fat per serving  · Include healthy fats  Replace foods that are high in saturated and trans fat with foods high in healthy fats  This may help to decrease high cholesterol levels  ¨ Monounsaturated fats: These are found in avocados, nuts, and vegetable oils, such as olive, canola, and sunflower oil  ¨ Polyunsaturated fats: These can be found in vegetable oils, such as soybean or corn oil  Omega-3 fats can help to decrease the risk of heart disease  Omega-3 fats are found in fish, such as salmon, herring, trout, and tuna   Omega-3 fats can also be found in plant foods, such as walnuts, flaxseed, soybeans, and canola oil    Foods to limit or avoid:   · Grains:      ¨ Snacks that are made with partially hydrogenated oils, such as chips, regular crackers, and butter-flavored popcorn    ¨ High-fat baked goods, such as biscuits, croissants, doughnuts, pies, cookies, and pastries    · Dairy:      ¨ Whole milk, 2% milk, and yogurt and ice cream made with whole milk    ¨ Half and half creamer, heavy cream, and whipping cream    ¨ Cheese, cream cheese, and sour cream    · Meats and proteins:      ¨ High-fat cuts of meat (T-bone steak, regular hamburger, and ribs)    ¨ Fried meat, poultry (turkey and chicken), and fish    ¨ Poultry (chicken and turkey) with skin    ¨ Cold cuts (salami or bologna), hot dogs, sylvester, and sausage    ¨ Whole eggs and egg yolks    · Vegetables and fruits with added fat:      ¨ Fried vegetables or vegetables in butter or high-fat sauces, such as cream or cheese sauces    ¨ Fried fruit or fruit served with butter or cream    · Fats:      ¨ Butter, stick margarine, and shortening    ¨ Coconut, palm oil, and palm kernel oil  Foods to include:   · Grains:      ¨ Whole-grain breads, cereals, pasta, and brown rice    ¨ Low-fat crackers and pretzels    · Vegetables and fruits:      ¨ Fresh, frozen, or canned vegetables (no salt or low-sodium)    ¨ Fresh, frozen, dried, or canned fruit (canned in light syrup or fruit juice)    ¨ Avocado    · Low-fat dairy products:      ¨ Nonfat (skim) or 1% milk    ¨ Nonfat or low-fat cheese, yogurt, and cottage cheese    · Meats and proteins:      ¨ Chicken or turkey with no skin    ¨ Baked or broiled fish    ¨ Lean beef and pork (loin, round, extra lean hamburger)    ¨ Beans and peas, unsalted nuts, soy products    ¨ Egg whites and substitutes    ¨ Seeds and nuts    · Fats:      ¨ Unsaturated oil, such as canola, olive, peanut, soybean, or sunflower oil    ¨ Soft or liquid margarine and vegetable oil spread    ¨ Low-fat salad dressing  Other ways to decrease fat:   · Read food labels before you buy foods  Choose foods that have less than 30% of calories from fat  Choose low-fat or fat-free dairy products  Remember that fat free does not mean calorie free  These foods still contain calories, and too many calories can lead to weight gain  · Trim fat from meat and avoid fried food  Trim all visible fat from meat before you cook it  Remove the skin from poultry  Do not mcmullen meat, fish, or poultry  Bake, roast, boil, or broil these foods instead  Avoid fried foods  Eat a baked potato instead of Western Ankita fries  Steam vegetables instead of sautéing them in butter  · Add less fat to foods  Use imitation sylvester bits on salads and baked potatoes instead of regular sylvester bits  Use fat-free or low-fat salad dressings instead of regular dressings  Use low-fat or nonfat butter-flavored topping instead of regular butter or margarine on popcorn and other foods  Ways to decrease fat in recipes:  Replace high-fat ingredients with low-fat or nonfat ones  This may cause baked goods to be drier than usual  You may need to use nonfat cooking spray on pans to prevent food from sticking  You also may need to change the amount of other ingredients, such as water, in the recipe  Try the following:  · Use low-fat or light margarine instead of regular margarine or shortening  · Use lean ground turkey breast or chicken, or lean ground beef (less than 5% fat) instead of hamburger  · Add 1 teaspoon of canola oil to 8 ounces of skim milk instead of using cream or half and half  · Use grated zucchini, carrots, or apples in breads instead of coconut  · Use blenderized, low-fat cottage cheese, plain tofu, or low-fat ricotta cheese instead of cream cheese  · Use 1 egg white and 1 teaspoon of canola oil, or use ¼ cup (2 ounces) of fat-free egg substitute instead of a whole egg       · Replace half of the oil that is called for in a recipe with applesauce when you bake  Use 3 tablespoons of cocoa powder and 1 tablespoon of canola oil instead of a square of baking chocolate  How to increase fiber:  Eat enough high-fiber foods to get 20 to 30 grams of fiber every day  Slowly increase your fiber intake to avoid stomach cramps, gas, and other problems  · Eat 3 ounces of whole-grain foods each day  An ounce is about 1 slice of bread  Eat whole-grain breads, such as whole-wheat bread  Whole wheat, whole-wheat flour, or other whole grains should be listed as the first ingredient on the food label  Replace white flour with whole-grain flour or use half of each in recipes  Whole-grain flour is heavier than white flour, so you may have to add more yeast or baking powder  · Eat a high-fiber cereal for breakfast   Oatmeal is a good source of soluble fiber  Look for cereals that have bran or fiber in the name  Choose whole-grain products, such as brown rice, barley, and whole-wheat pasta  · Eat more beans, peas, and lentils  For example, add beans to soups or salads  Eat at least 5 cups of fruits and vegetables each day  Eat fruits and vegetables with the peel because the peel is high in fiber  © 2017 [address] [employer] Information is for End User's use only and may not be sold, redistributed or otherwise used for commercial purposes  All illustrations and images included in CareNotes® are the copyrighted property of A D A M [employer]  or [employer]  The above information is an  only  It is not intended as medical advice for individual conditions or treatments  Talk to your doctor, nurse or pharmacist before following any medical regimen to see if it is safe and effective for you  Heart Healthy Diet   AMBULATORY CARE:   A heart healthy diet  is an eating plan low in total fat, unhealthy fats, and sodium (salt)  A heart healthy diet helps decrease your risk for heart disease and stroke   Limit the amount of fat you eat to 25% to 35% of your total daily calories  Limit sodium to less than 2,300 mg each day  Healthy fats:  Healthy fats can help improve cholesterol levels  The risk for heart disease is decreased when cholesterol levels are normal  Choose healthy fats, such as the following:  · Unsaturated fat  is found in foods such as soybean, canola, olive, corn, and safflower oils  It is also found in soft tub margarine that is made with liquid vegetable oil  · Omega-3 fat  is found in certain fish, such as salmon, tuna, and trout, and in walnuts and flaxseed  Unhealthy fats:  Unhealthy fats can cause unhealthy cholesterol levels in your blood and increase your risk of heart disease  Limit unhealthy fats, such as the following:  · Cholesterol  is found in animal foods, such as eggs and lobster, and in dairy products made from whole milk  Limit cholesterol to less than 300 milligrams (mg) each day  You may need to limit cholesterol to 200 mg each day if you have heart disease  · Saturated fat  is found in meats, such as sylvester and hamburger  It is also found in chicken or turkey skin, whole milk, and butter  Limit saturated fat to less than 7% of your total daily calories  Limit saturated fat to less than 6% if you have heart disease or are at increased risk for it  · Trans fat  is found in packaged foods, such as potato chips and cookies  It is also in hard margarine, some fried foods, and shortening  Avoid trans fats as much as possible    Heart healthy foods and drinks to include:  Ask your dietitian or healthcare provider how many servings to have from each of the following food groups:  · Grains:      ¨ Whole-wheat breads, cereals, and pastas, and brown rice    ¨ Low-fat, low-sodium crackers and chips    · Vegetables:      ¨ Broccoli, green beans, green peas, and spinach    ¨ Collards, kale, and lima beans    ¨ Carrots, sweet potatoes, tomatoes, and peppers    ¨ Canned vegetables with no salt added    · Fruits:      ¨ Bananas, peaches, pears, and pineapple    ¨ Grapes, raisins, and dates    ¨ Oranges, tangerines, grapefruit, orange juice, and grapefruit juice    ¨ Apricots, mangoes, melons, and papaya    ¨ Raspberries and strawberries    ¨ Canned fruit with no added sugar    · Low-fat dairy products:      ¨ Nonfat (skim) milk, 1% milk, and low-fat almond, cashew, or soy milks fortified with calcium    ¨ Low-fat cheese, regular or frozen yogurt, and cottage cheese    · Meats and proteins , such as lean cuts of beef and pork (loin, leg, round), skinless chicken and turkey, legumes, soy products, egg whites, and nuts  Foods and drinks to limit or avoid:  Ask your dietitian or healthcare provider about these and other foods that are high in unhealthy fat, sodium, and sugar:  · Snack or packaged foods , such as frozen dinners, cookies, macaroni and cheese, and cereals with more than 300 mg of sodium per serving    · Canned or dry mixes  for cakes, soups, sauces, or gravies    · Vegetables with added sodium , such as instant potatoes, vegetables with added sauces, or regular canned vegetables    · Other foods high in sodium , such as ketchup, barbecue sauce, salad dressing, pickles, olives, soy sauce, and miso    · High-fat dairy foods  such as whole or 2% milk, cream cheese, or sour cream, and cheeses     · High-fat protein foods  such as high-fat cuts of beef (T-bone steaks, ribs), chicken or turkey with skin, and organ meats, such as liver    · Cured or smoked meats , such as hot dogs, sylvester, and sausage    · Unhealthy fats and oils , such as butter, stick margarine, shortening, and cooking oils such as coconut or palm oil    · Food and drinks high in sugar , such as soft drinks (soda), sports drinks, sweetened tea, candy, cake, cookies, pies, and doughnuts  Other diet guidelines to follow:   · Eat more foods containing omega-3 fats  Eat fish high in omega-3 fats at least 2 times a week  · Limit alcohol    Too much alcohol can damage your heart and raise your blood pressure  Women should limit alcohol to 1 drink a day  Men should limit alcohol to 2 drinks a day  A drink of alcohol is 12 ounces of beer, 5 ounces of wine, or 1½ ounces of liquor  · Choose low-sodium foods  High-sodium foods can lead to high blood pressure  Add little or no salt to food you prepare  Use herbs and spices in place of salt  · Eat more fiber  to help lower cholesterol levels  Eat at least 5 servings of fruits and vegetables each day  Eat 3 ounces of whole-grain foods each day  Legumes (beans) are also a good source of fiber  Lifestyle guidelines:   · Do not smoke  Nicotine and other chemicals in cigarettes and cigars can cause lung and heart damage  Ask your healthcare provider for information if you currently smoke and need help to quit  E-cigarettes or smokeless tobacco still contain nicotine  Talk to your healthcare provider before you use these products  · Exercise regularly  to help you maintain a healthy weight and improve your blood pressure and cholesterol levels  Ask your healthcare provider about the best exercise plan for you  Do not start an exercise program without asking your healthcare provider  Follow up with your healthcare provider as directed:  Write down your questions so you remember to ask them during your visits  © 2017 [address] [facility] Information is for End User's use only and may not be sold, redistributed or otherwise used for commercial purposes  All illustrations and images included in CareNotes® are the copyrighted property of [employer]  or [employer]  The above information is an  only  It is not intended as medical advice for individual conditions or treatments  Talk to your doctor, nurse or pharmacist before following any medical regimen to see if it is safe and effective for you  Calorie Counting Diet   WHAT YOU NEED TO KNOW:   What is a calorie counting diet?   It is a meal plan based on counting calories each day to reach a healthy body weight  You will need to eat fewer calories if you are trying to lose weight  Weight loss may decrease your risk for certain health problems or improve your health if you have health problems  Some of these health problems include heart disease, high blood pressure, and diabetes  What foods should I avoid? Your dietitian will tell you if you need to avoid certain foods based on your body weight and health condition  You may need to avoid high-fat foods if you are at risk for or have heart disease  You may need to eat fewer foods from the breads and starches food group if you have diabetes  How many calories are in foods? The following is a list of foods and drinks with the approximate number of calories in each  Check the food label to find the exact number of calories  A dietitian can tell you how many calories you should have from each food group each day    · Carbohydrate:      ¨ ½ of a 3-inch bagel, 1 slice of bread, or ½ of a hamburger bun or hot dog bun (80)    ¨ 1 (8-inch) flour tortilla or ½ cup of cooked rice (100)    ¨ 1 (6-inch) corn tortilla (80)    ¨ 1 (6-inch) pancake or 1 cup of bran flakes cereal (110)    ¨ ½ cup of cooked cereal (80)    ¨ ½ cup of cooked pasta (85)    ¨ 1 ounce of pretzels (100)    ¨ 3 cups of air-popped popcorn without butter or oil (80)    · Dairy:      ¨ 1 cup of skim or 1% milk (90)    ¨ 1 cup of 2% milk (120)    ¨ 1 cup of whole milk (160)    ¨ 1 cup of 2% chocolate milk (220)    ¨ 1 ounce of low-fat cheese with 3 grams of fat per ounce (70)    ¨ 1 ounce of cheddar cheese (114)    ¨ ½ cup of 1% fat cottage cheese (80)    ¨ 1 cup of plain or sugar-free, fat-free yogurt (90)    · Protein foods:      ¨ 3 ounces of fish (not breaded or fried) (95)    ¨ 3 ounces of breaded, fried fish (195)    ¨ ¾ cup of tuna canned in water (105)    ¨ 3 ounces of chicken breast without skin (105)    ¨ 1 fried chicken breast with skin (350)    ¨ ¼ cup of fat free egg substitute (40)    ¨ 1 large egg (75)    ¨ 3 ounces of lean beef or pork (165)    ¨ 3 ounces of fried pork chop or ham (185)    ¨ ½ cup of cooked dried beans, such as kidney, mcneal, lentils, or navy (115)    ¨ 3 ounces of bologna or lunch meat (225)    ¨ 2 links of breakfast sausage (140)    · Vegetables:      ¨ ½ cup of sliced mushrooms (10)    ¨ 1 cup of salad greens, such as lettuce, spinach, or colby (15)    ¨ ½ cup of steamed asparagus (20)    ¨ ½ cup of cooked summer squash, zucchini squash, or green or wax beans (25)    ¨ 1 cup of broccoli or cauliflower florets, or 1 medium tomato (25)    ¨ 1 large raw carrot or ½ cup of cooked carrots (40)    ¨ ? of a medium cucumber or 1 stalk of celery (5)    ¨ 1 small baked potato (160)    ¨ 1 cup of breaded, fried vegetables (230)    · Fruit:      ¨ 1 (6-inch) banana (55)     ¨ ½ of a 4-inch grapefruit (55)    ¨ 15 grapes (60)    ¨ 1 medium orange or apple (70)    ¨ 1 large peach (65)    ¨ 1 cup of fresh pineapple chunks (75)    ¨ 1 cup of melon cubes (50)    ¨ 1¼ cups of whole strawberries (45)    ¨ ½ cup of fruit canned in juice (55)    ¨ ½ cup of fruit canned in heavy syrup (110)    ¨ ?  cup of raisins (130)    ¨ ½ cup of unsweetened fruit juice (60)    ¨ ½ cup of grape, cranberry, or prune juice (90)    · Fat:      ¨ 10 peanuts or 2 teaspoons of peanut butter (55)    ¨ 2 tablespoons of avocado or 1 tablespoon of regular salad dressing (45)    ¨ 2 slices of sylvester (90)    ¨ 1 teaspoon of oil, such as safflower, canola, corn, or olive oil (45)    ¨ 2 teaspoons of low-fat margarine, or 1 tablespoon of low-fat mayonnaise (50)    ¨ 1 teaspoon of regular margarine (40)    ¨ 1 tablespoon of regular mayonnaise (135)    ¨ 1 tablespoon of cream cheese or 2 tablespoons of low-fat cream cheese (45)    ¨ 2 tablespoons of vegetable shortening (215)    · Dessert and sweets:      ¨ 8 animal crackers or 5 vanilla wafers (80)    ¨ 1 frozen fruit juice bar (80)    ¨ ½ cup of ice milk or low-fat frozen yogurt (90)    ¨ ½ cup of sherbet or sorbet (125)    ¨ ½ cup of sugar-free pudding or custard (60)    ¨ ½ cup of ice cream (140)    ¨ ½ cup of pudding or custard (175)    ¨ 1 (2-inch) square chocolate brownie (185)    · Combination foods:      ¨ Bean burrito made with an 8-inch tortilla, without cheese (275)    ¨ Chicken breast sandwich with lettuce and tomato (325)    ¨ 1 cup of chicken noodle soup (60)    ¨ 1 beef taco (175)    ¨ Regular hamburger with lettuce and tomato (310)    ¨ Regular cheeseburger with lettuce and tomato (410)     ¨ ¼ of a 12-inch cheese pizza (280)    ¨ Fried fish sandwich with lettuce and tomato (425)    ¨ Hot dog and bun (275)    ¨ 1½ cups of macaroni and cheese (310)    ¨ Taco salad with a fried tortilla shell (870)    · Low-calorie foods:      ¨ 1 tablespoon of ketchup or 1 tablespoon of fat free sour cream (15)    ¨ 1 teaspoon of mustard (5)    ¨ ¼ cup of salsa (20)    ¨ 1 large dill pickle (15)    ¨ 1 tablespoon of fat free salad dressing (10)    ¨ 2 teaspoons of low-sugar, light jam or jelly, or 1 tablespoon of sugar-free syrup (15)    ¨ 1 sugar-free popsicle (15)    ¨ 1 cup of club soda, seltzer water, or diet soda (0)  CARE AGREEMENT:   You have the right to help plan your care  Discuss treatment options with your caregivers to decide what care you want to receive  You always have the right to refuse treatment  The above information is an  only  It is not intended as medical advice for individual conditions or treatments  Talk to your doctor, nurse or pharmacist before following any medical regimen to see if it is safe and effective for you  © 2017 [address] Information is for End User's use only and may not be sold, redistributed or otherwise used for commercial purposes   All illustrations and images included in CareNotes® are the copyrighted property of [employer]  or [employer]  Low Fat Diet   AMBULATORY CARE:   A low-fat diet  is an eating plan that is low in total fat, unhealthy fat, and cholesterol  You may need to follow a low-fat diet if you have trouble digesting or absorbing fat  You may also need to follow this diet if you have high cholesterol  You can also lower your cholesterol by increasing the amount of fiber in your diet  Soluble fiber is a type of fiber that helps to decrease cholesterol levels  Different types of fat in food:   · Limit unhealthy fats  A diet that is high in cholesterol, saturated fat, and trans fat may cause unhealthy cholesterol levels  Unhealthy cholesterol levels increase your risk of heart disease  ¨ Cholesterol:  Limit intake of cholesterol to less than 200 mg per day  Cholesterol is found in meat, eggs, and dairy  ¨ Saturated fat:  Limit saturated fat to less than 7% of your total daily calories  Ask your dietitian how many calories you need each day  Saturated fat is found in butter, cheese, ice cream, whole milk, and palm oil  Saturated fat is also found in meat, such as beef, pork, chicken skin, and processed meats  Processed meats include sausage, hot dogs, and bologna  ¨ Trans fat:  Avoid trans fat as much as possible  Trans fat is used in fried and baked foods  Foods that say trans fat free on the label may still have up to 0 5 grams of trans fat per serving  · Include healthy fats  Replace foods that are high in saturated and trans fat with foods high in healthy fats  This may help to decrease high cholesterol levels  ¨ Monounsaturated fats: These are found in avocados, nuts, and vegetable oils, such as olive, canola, and sunflower oil  ¨ Polyunsaturated fats: These can be found in vegetable oils, such as soybean or corn oil  Omega-3 fats can help to decrease the risk of heart disease  Omega-3 fats are found in fish, such as salmon, herring, trout, and tuna   Omega-3 fats can also be found in plant foods, such as walnuts, flaxseed, soybeans, and canola oil    Foods to limit or avoid:   · Grains:      ¨ Snacks that are made with partially hydrogenated oils, such as chips, regular crackers, and butter-flavored popcorn    ¨ High-fat baked goods, such as biscuits, croissants, doughnuts, pies, cookies, and pastries    · Dairy:      ¨ Whole milk, 2% milk, and yogurt and ice cream made with whole milk    ¨ Half and half creamer, heavy cream, and whipping cream    ¨ Cheese, cream cheese, and sour cream    · Meats and proteins:      ¨ High-fat cuts of meat (T-bone steak, regular hamburger, and ribs)    ¨ Fried meat, poultry (turkey and chicken), and fish    ¨ Poultry (chicken and turkey) with skin    ¨ Cold cuts (salami or bologna), hot dogs, sylvester, and sausage    ¨ Whole eggs and egg yolks    · Vegetables and fruits with added fat:      ¨ Fried vegetables or vegetables in butter or high-fat sauces, such as cream or cheese sauces    ¨ Fried fruit or fruit served with butter or cream    · Fats:      ¨ Butter, stick margarine, and shortening    ¨ Coconut, palm oil, and palm kernel oil  Foods to include:   · Grains:      ¨ Whole-grain breads, cereals, pasta, and brown rice    ¨ Low-fat crackers and pretzels    · Vegetables and fruits:      ¨ Fresh, frozen, or canned vegetables (no salt or low-sodium)    ¨ Fresh, frozen, dried, or canned fruit (canned in light syrup or fruit juice)    ¨ Avocado    · Low-fat dairy products:      ¨ Nonfat (skim) or 1% milk    ¨ Nonfat or low-fat cheese, yogurt, and cottage cheese    · Meats and proteins:      ¨ Chicken or turkey with no skin    ¨ Baked or broiled fish    ¨ Lean beef and pork (loin, round, extra lean hamburger)    ¨ Beans and peas, unsalted nuts, soy products    ¨ Egg whites and substitutes    ¨ Seeds and nuts    · Fats:      ¨ Unsaturated oil, such as canola, olive, peanut, soybean, or sunflower oil    ¨ Soft or liquid margarine and vegetable oil spread    ¨ Low-fat salad dressing  Other ways to decrease fat:   · Read food labels before you buy foods  Choose foods that have less than 30% of calories from fat  Choose low-fat or fat-free dairy products  Remember that fat free does not mean calorie free  These foods still contain calories, and too many calories can lead to weight gain  · Trim fat from meat and avoid fried food  Trim all visible fat from meat before you cook it  Remove the skin from poultry  Do not mcmullen meat, fish, or poultry  Bake, roast, boil, or broil these foods instead  Avoid fried foods  Eat a baked potato instead of Western Ankita fries  Steam vegetables instead of sautéing them in butter  · Add less fat to foods  Use imitation sylvester bits on salads and baked potatoes instead of regular sylvester bits  Use fat-free or low-fat salad dressings instead of regular dressings  Use low-fat or nonfat butter-flavored topping instead of regular butter or margarine on popcorn and other foods  Ways to decrease fat in recipes:  Replace high-fat ingredients with low-fat or nonfat ones  This may cause baked goods to be drier than usual  You may need to use nonfat cooking spray on pans to prevent food from sticking  You also may need to change the amount of other ingredients, such as water, in the recipe  Try the following:  · Use low-fat or light margarine instead of regular margarine or shortening  · Use lean ground turkey breast or chicken, or lean ground beef (less than 5% fat) instead of hamburger  · Add 1 teaspoon of canola oil to 8 ounces of skim milk instead of using cream or half and half  · Use grated zucchini, carrots, or apples in breads instead of coconut  · Use blenderized, low-fat cottage cheese, plain tofu, or low-fat ricotta cheese instead of cream cheese  · Use 1 egg white and 1 teaspoon of canola oil, or use ¼ cup (2 ounces) of fat-free egg substitute instead of a whole egg       · Replace half of the oil that is called for in a recipe with applesauce when you bake  Use 3 tablespoons of cocoa powder and 1 tablespoon of canola oil instead of a square of baking chocolate  How to increase fiber:  Eat enough high-fiber foods to get 20 to 30 grams of fiber every day  Slowly increase your fiber intake to avoid stomach cramps, gas, and other problems  · Eat 3 ounces of whole-grain foods each day  An ounce is about 1 slice of bread  Eat whole-grain breads, such as whole-wheat bread  Whole wheat, whole-wheat flour, or other whole grains should be listed as the first ingredient on the food label  Replace white flour with whole-grain flour or use half of each in recipes  Whole-grain flour is heavier than white flour, so you may have to add more yeast or baking powder  · Eat a high-fiber cereal for breakfast   Oatmeal is a good source of soluble fiber  Look for cereals that have bran or fiber in the name  Choose whole-grain products, such as brown rice, barley, and whole-wheat pasta  · Eat more beans, peas, and lentils  For example, add beans to soups or salads  Eat at least 5 cups of fruits and vegetables each day  Eat fruits and vegetables with the peel because the peel is high in fiber  © 2017 [employer]  Information is for End User's use only and may not be sold, redistributed or otherwise used for commercial purposes  All illustrations and images included in CareNotes® are the copyrighted property of [employer]  or [employer]  The above information is an  only  It is not intended as medical advice for individual conditions or treatments  Talk to your doctor, nurse or pharmacist before following any medical regimen to see if it is safe and effective for you  Heart Healthy Diet   AMBULATORY CARE:   A heart healthy diet  is an eating plan low in total fat, unhealthy fats, and sodium (salt)  A heart healthy diet helps decrease your risk for heart disease and stroke   Limit the amount of fat you eat to 25% to 35% of your total daily calories  Limit sodium to less than 2,300 mg each day  Healthy fats:  Healthy fats can help improve cholesterol levels  The risk for heart disease is decreased when cholesterol levels are normal  Choose healthy fats, such as the following:  · Unsaturated fat  is found in foods such as soybean, canola, olive, corn, and safflower oils  It is also found in soft tub margarine that is made with liquid vegetable oil  · Omega-3 fat  is found in certain fish, such as salmon, tuna, and trout, and in walnuts and flaxseed  Unhealthy fats:  Unhealthy fats can cause unhealthy cholesterol levels in your blood and increase your risk of heart disease  Limit unhealthy fats, such as the following:  · Cholesterol  is found in animal foods, such as eggs and lobster, and in dairy products made from whole milk  Limit cholesterol to less than 300 milligrams (mg) each day  You may need to limit cholesterol to 200 mg each day if you have heart disease  · Saturated fat  is found in meats, such as sylvester and hamburger  It is also found in chicken or turkey skin, whole milk, and butter  Limit saturated fat to less than 7% of your total daily calories  Limit saturated fat to less than 6% if you have heart disease or are at increased risk for it  · Trans fat  is found in packaged foods, such as potato chips and cookies  It is also in hard margarine, some fried foods, and shortening  Avoid trans fats as much as possible    Heart healthy foods and drinks to include:  Ask your dietitian or healthcare provider how many servings to have from each of the following food groups:  · Grains:      ¨ Whole-wheat breads, cereals, and pastas, and brown rice    ¨ Low-fat, low-sodium crackers and chips    · Vegetables:      ¨ Broccoli, green beans, green peas, and spinach    ¨ Collards, kale, and lima beans    ¨ Carrots, sweet potatoes, tomatoes, and peppers    ¨ Canned vegetables with no salt added    · Fruits: ¨ Bananas, peaches, pears, and pineapple    ¨ Grapes, raisins, and dates    ¨ Oranges, tangerines, grapefruit, orange juice, and grapefruit juice    ¨ Apricots, mangoes, melons, and papaya    ¨ Raspberries and strawberries    ¨ Canned fruit with no added sugar    · Low-fat dairy products:      ¨ Nonfat (skim) milk, 1% milk, and low-fat almond, cashew, or soy milks fortified with calcium    ¨ Low-fat cheese, regular or frozen yogurt, and cottage cheese    · Meats and proteins , such as lean cuts of beef and pork (loin, leg, round), skinless chicken and turkey, legumes, soy products, egg whites, and nuts  Foods and drinks to limit or avoid:  Ask your dietitian or healthcare provider about these and other foods that are high in unhealthy fat, sodium, and sugar:  · Snack or packaged foods , such as frozen dinners, cookies, macaroni and cheese, and cereals with more than 300 mg of sodium per serving    · Canned or dry mixes  for cakes, soups, sauces, or gravies    · Vegetables with added sodium , such as instant potatoes, vegetables with added sauces, or regular canned vegetables    · Other foods high in sodium , such as ketchup, barbecue sauce, salad dressing, pickles, olives, soy sauce, and miso    · High-fat dairy foods  such as whole or 2% milk, cream cheese, or sour cream, and cheeses     · High-fat protein foods  such as high-fat cuts of beef (T-bone steaks, ribs), chicken or turkey with skin, and organ meats, such as liver    · Cured or smoked meats , such as hot dogs, sylvester, and sausage    · Unhealthy fats and oils , such as butter, stick margarine, shortening, and cooking oils such as coconut or palm oil    · Food and drinks high in sugar , such as soft drinks (soda), sports drinks, sweetened tea, candy, cake, cookies, pies, and doughnuts  Other diet guidelines to follow:   · Eat more foods containing omega-3 fats  Eat fish high in omega-3 fats at least 2 times a week  · Limit alcohol    Too much alcohol can damage your heart and raise your blood pressure  Women should limit alcohol to 1 drink a day  Men should limit alcohol to 2 drinks a day  A drink of alcohol is 12 ounces of beer, 5 ounces of wine, or 1½ ounces of liquor  · Choose low-sodium foods  High-sodium foods can lead to high blood pressure  Add little or no salt to food you prepare  Use herbs and spices in place of salt  · Eat more fiber  to help lower cholesterol levels  Eat at least 5 servings of fruits and vegetables each day  Eat 3 ounces of whole-grain foods each day  Legumes (beans) are also a good source of fiber  Lifestyle guidelines:   · Do not smoke  Nicotine and other chemicals in cigarettes and cigars can cause lung and heart damage  Ask your healthcare provider for information if you currently smoke and need help to quit  E-cigarettes or smokeless tobacco still contain nicotine  Talk to your healthcare provider before you use these products  · Exercise regularly  to help you maintain a healthy weight and improve your blood pressure and cholesterol levels  Ask your healthcare provider about the best exercise plan for you  Do not start an exercise program without asking your healthcare provider  Follow up with your healthcare provider as directed:  Write down your questions so you remember to ask them during your visits  © 2017 [employer]  Information is for End User's use only and may not be sold, redistributed or otherwise used for commercial purposes  All illustrations and images included in CareNotes® are the copyrighted property of [employer]  or [employer]  The above information is an  only  It is not intended as medical advice for individual conditions or treatments  Talk to your doctor, nurse or pharmacist before following any medical regimen to see if it is safe and effective for you  Calorie Counting Diet   WHAT YOU NEED TO KNOW:   What is a calorie counting diet?   It is a meal plan based on counting calories each day to reach a healthy body weight  You will need to eat fewer calories if you are trying to lose weight  Weight loss may decrease your risk for certain health problems or improve your health if you have health problems  Some of these health problems include heart disease, high blood pressure, and diabetes  What foods should I avoid? Your dietitian will tell you if you need to avoid certain foods based on your body weight and health condition  You may need to avoid high-fat foods if you are at risk for or have heart disease  You may need to eat fewer foods from the breads and starches food group if you have diabetes  How many calories are in foods? The following is a list of foods and drinks with the approximate number of calories in each  Check the food label to find the exact number of calories  A dietitian can tell you how many calories you should have from each food group each day    · Carbohydrate:      ¨ ½ of a 3-inch bagel, 1 slice of bread, or ½ of a hamburger bun or hot dog bun (80)    ¨ 1 (8-inch) flour tortilla or ½ cup of cooked rice (100)    ¨ 1 (6-inch) corn tortilla (80)    ¨ 1 (6-inch) pancake or 1 cup of bran flakes cereal (110)    ¨ ½ cup of cooked cereal (80)    ¨ ½ cup of cooked pasta (85)    ¨ 1 ounce of pretzels (100)    ¨ 3 cups of air-popped popcorn without butter or oil (80)    · Dairy:      ¨ 1 cup of skim or 1% milk (90)    ¨ 1 cup of 2% milk (120)    ¨ 1 cup of whole milk (160)    ¨ 1 cup of 2% chocolate milk (220)    ¨ 1 ounce of low-fat cheese with 3 grams of fat per ounce (70)    ¨ 1 ounce of cheddar cheese (114)    ¨ ½ cup of 1% fat cottage cheese (80)    ¨ 1 cup of plain or sugar-free, fat-free yogurt (90)    · Protein foods:      ¨ 3 ounces of fish (not breaded or fried) (95)    ¨ 3 ounces of breaded, fried fish (195)    ¨ ¾ cup of tuna canned in water (105)    ¨ 3 ounces of chicken breast without skin (105)    ¨ 1 fried chicken breast with skin (350)    ¨ ¼ cup of fat free egg substitute (40)    ¨ 1 large egg (75)    ¨ 3 ounces of lean beef or pork (165)    ¨ 3 ounces of fried pork chop or ham (185)    ¨ ½ cup of cooked dried beans, such as kidney, mcneal, lentils, or navy (115)    ¨ 3 ounces of bologna or lunch meat (225)    ¨ 2 links of breakfast sausage (140)    · Vegetables:      ¨ ½ cup of sliced mushrooms (10)    ¨ 1 cup of salad greens, such as lettuce, spinach, or colby (15)    ¨ ½ cup of steamed asparagus (20)    ¨ ½ cup of cooked summer squash, zucchini squash, or green or wax beans (25)    ¨ 1 cup of broccoli or cauliflower florets, or 1 medium tomato (25)    ¨ 1 large raw carrot or ½ cup of cooked carrots (40)    ¨ ? of a medium cucumber or 1 stalk of celery (5)    ¨ 1 small baked potato (160)    ¨ 1 cup of breaded, fried vegetables (230)    · Fruit:      ¨ 1 (6-inch) banana (55)     ¨ ½ of a 4-inch grapefruit (55)    ¨ 15 grapes (60)    ¨ 1 medium orange or apple (70)    ¨ 1 large peach (65)    ¨ 1 cup of fresh pineapple chunks (75)    ¨ 1 cup of melon cubes (50)    ¨ 1¼ cups of whole strawberries (45)    ¨ ½ cup of fruit canned in juice (55)    ¨ ½ cup of fruit canned in heavy syrup (110)    ¨ ?  cup of raisins (130)    ¨ ½ cup of unsweetened fruit juice (60)    ¨ ½ cup of grape, cranberry, or prune juice (90)    · Fat:      ¨ 10 peanuts or 2 teaspoons of peanut butter (55)    ¨ 2 tablespoons of avocado or 1 tablespoon of regular salad dressing (45)    ¨ 2 slices of sylvester (90)    ¨ 1 teaspoon of oil, such as safflower, canola, corn, or olive oil (45)    ¨ 2 teaspoons of low-fat margarine, or 1 tablespoon of low-fat mayonnaise (50)    ¨ 1 teaspoon of regular margarine (40)    ¨ 1 tablespoon of regular mayonnaise (135)    ¨ 1 tablespoon of cream cheese or 2 tablespoons of low-fat cream cheese (45)    ¨ 2 tablespoons of vegetable shortening (215)    · Dessert and sweets:      ¨ 8 animal crackers or 5 vanilla wafers (80)    ¨ 1 frozen fruit juice bar (80)    ¨ ½ cup of ice milk or low-fat frozen yogurt (90)    ¨ ½ cup of sherbet or sorbet (125)    ¨ ½ cup of sugar-free pudding or custard (60)    ¨ ½ cup of ice cream (140)    ¨ ½ cup of pudding or custard (175)    ¨ 1 (2-inch) square chocolate brownie (185)    · Combination foods:      ¨ Bean burrito made with an 8-inch tortilla, without cheese (275)    ¨ Chicken breast sandwich with lettuce and tomato (325)    ¨ 1 cup of chicken noodle soup (60)    ¨ 1 beef taco (175)    ¨ Regular hamburger with lettuce and tomato (310)    ¨ Regular cheeseburger with lettuce and tomato (410)     ¨ ¼ of a 12-inch cheese pizza (280)    ¨ Fried fish sandwich with lettuce and tomato (425)    ¨ Hot dog and bun (275)    ¨ 1½ cups of macaroni and cheese (310)    ¨ Taco salad with a fried tortilla shell (870)    · Low-calorie foods:      ¨ 1 tablespoon of ketchup or 1 tablespoon of fat free sour cream (15)    ¨ 1 teaspoon of mustard (5)    ¨ ¼ cup of salsa (20)    ¨ 1 large dill pickle (15)    ¨ 1 tablespoon of fat free salad dressing (10)    ¨ 2 teaspoons of low-sugar, light jam or jelly, or 1 tablespoon of sugar-free syrup (15)    ¨ 1 sugar-free popsicle (15)    ¨ 1 cup of club soda, seltzer water, or diet soda (0)  CARE AGREEMENT:   You have the right to help plan your care  Discuss treatment options with your caregivers to decide what care you want to receive  You always have the right to refuse treatment  The above information is an  only  It is not intended as medical advice for individual conditions or treatments  Talk to your doctor, nurse or pharmacist before following any medical regimen to see if it is safe and effective for you  © 2017 [employer] [employer] Information is for End User's use only and may not be sold, redistributed or otherwise used for commercial purposes   All illustrations and images included in CareNotes® are the copyrighted property of [employer]  or [employer]

## 2018-12-14 DIAGNOSIS — I10 ESSENTIAL HYPERTENSION: ICD-10-CM

## 2018-12-14 RX ORDER — ATENOLOL 50 MG/1
50 TABLET ORAL DAILY
Qty: 90 TABLET | Refills: 3 | Status: SHIPPED | OUTPATIENT
Start: 2018-12-14 | End: 2019-04-11 | Stop reason: SDUPTHER

## 2018-12-14 RX ORDER — AMLODIPINE BESYLATE 5 MG/1
5 TABLET ORAL DAILY
Qty: 90 TABLET | Refills: 3 | Status: SHIPPED | OUTPATIENT
Start: 2018-12-14 | End: 2020-05-08 | Stop reason: SDUPTHER

## 2018-12-26 ENCOUNTER — TELEPHONE (OUTPATIENT)
Dept: INTERNAL MEDICINE CLINIC | Facility: CLINIC | Age: 83
End: 2018-12-26

## 2018-12-26 DIAGNOSIS — J40 BRONCHITIS: Primary | ICD-10-CM

## 2018-12-26 RX ORDER — AZITHROMYCIN 250 MG/1
TABLET, FILM COATED ORAL
Qty: 6 TABLET | Refills: 0 | Status: SHIPPED | OUTPATIENT
Start: 2018-12-26 | End: 2018-12-31

## 2018-12-26 NOTE — TELEPHONE ENCOUNTER
Nursing home called and states she is not feeling well  She has a chest cold  She is producing yellow phlegm when she coughs, Patient is wondering if you would prescribe her a z-pack  No fever no nausea or vomiting or SOB      Pharm: [facility] in [place]

## 2019-01-15 ENCOUNTER — TELEPHONE (OUTPATIENT)
Dept: INTERNAL MEDICINE CLINIC | Facility: CLINIC | Age: 84
End: 2019-01-15

## 2019-01-15 DIAGNOSIS — J40 BRONCHITIS: Primary | ICD-10-CM

## 2019-01-15 RX ORDER — BENZONATATE 100 MG/1
100 CAPSULE ORAL 3 TIMES DAILY PRN
Qty: 60 CAPSULE | Refills: 1 | Status: SHIPPED | OUTPATIENT
Start: 2019-01-15 | End: 2019-06-07 | Stop reason: ALTCHOICE

## 2019-01-15 NOTE — PROGRESS NOTES
Patient called  Had called in a prescription approximately 3 weeks ago for a Zithromax Z-Favio and patient was encouraged to take Robitussin DM to help with cough  She states that the Robitussin did help with cough but she still having the cough after stopping the Robitussin  She denies any fever or chills and no chest pain or shortness of breath no increased swelling of the feet or ankles  She does have some nasal congestion but not severe  She does have electric heat in the house and states it is dry and I did suggest that she use a humidifier  We also sent a prescription into the pharmacy for Tessalon Perles to take 1 3 times a day as needed to help with the tickle in her throat  We told the patient that it is extremely difficult to diagnose her over the telephone and that if she is not improving she needs to come into the office for further evaluation and treatment

## 2019-01-15 NOTE — TELEPHONE ENCOUNTER
Patient called to report that she's still not feeling well  She has a really bad cough and would like to know if there is anything else you would like for her to take

## 2019-02-22 DIAGNOSIS — D50.8 IRON DEFICIENCY ANEMIA SECONDARY TO INADEQUATE DIETARY IRON INTAKE: ICD-10-CM

## 2019-02-22 RX ORDER — FERROUS SULFATE 325(65) MG
1 TABLET ORAL DAILY
Qty: 30 TABLET | Refills: 5 | Status: SHIPPED | OUTPATIENT
Start: 2019-02-22 | End: 2019-10-01 | Stop reason: SDUPTHER

## 2019-04-03 ENCOUNTER — OFFICE VISIT (OUTPATIENT)
Dept: INTERNAL MEDICINE CLINIC | Facility: CLINIC | Age: 84
End: 2019-04-03
Payer: COMMERCIAL

## 2019-04-03 VITALS
SYSTOLIC BLOOD PRESSURE: 138 MMHG | WEIGHT: 182 LBS | HEIGHT: 66 IN | BODY MASS INDEX: 29.25 KG/M2 | DIASTOLIC BLOOD PRESSURE: 76 MMHG | TEMPERATURE: 97.6 F | OXYGEN SATURATION: 94 % | HEART RATE: 70 BPM

## 2019-04-03 DIAGNOSIS — I25.10 ARTERIOSCLEROTIC CARDIOVASCULAR DISEASE: Primary | ICD-10-CM

## 2019-04-03 DIAGNOSIS — I12.9 BENIGN HYPERTENSION WITH CHRONIC KIDNEY DISEASE, STAGE III (HCC): ICD-10-CM

## 2019-04-03 DIAGNOSIS — E78.00 PURE HYPERCHOLESTEROLEMIA: ICD-10-CM

## 2019-04-03 DIAGNOSIS — I48.0 PAROXYSMAL ATRIAL FIBRILLATION (HCC): ICD-10-CM

## 2019-04-03 DIAGNOSIS — N18.30 BENIGN HYPERTENSION WITH CHRONIC KIDNEY DISEASE, STAGE III (HCC): ICD-10-CM

## 2019-04-03 DIAGNOSIS — E66.3 OVERWEIGHT (BMI 25.0-29.9): ICD-10-CM

## 2019-04-03 PROCEDURE — 99214 OFFICE O/P EST MOD 30 MIN: CPT | Performed by: INTERNAL MEDICINE

## 2019-04-03 PROCEDURE — 1160F RVW MEDS BY RX/DR IN RCRD: CPT | Performed by: INTERNAL MEDICINE

## 2019-04-03 PROCEDURE — 1036F TOBACCO NON-USER: CPT | Performed by: INTERNAL MEDICINE

## 2019-04-11 DIAGNOSIS — I10 ESSENTIAL HYPERTENSION: ICD-10-CM

## 2019-04-11 DIAGNOSIS — E78.5 HYPERLIPIDEMIA, UNSPECIFIED HYPERLIPIDEMIA TYPE: ICD-10-CM

## 2019-04-11 RX ORDER — ATENOLOL 50 MG/1
50 TABLET ORAL DAILY
Qty: 90 TABLET | Refills: 3 | Status: SHIPPED | OUTPATIENT
Start: 2019-04-11 | End: 2020-04-12

## 2019-04-11 RX ORDER — ATORVASTATIN CALCIUM 10 MG/1
10 TABLET, FILM COATED ORAL DAILY
Qty: 90 TABLET | Refills: 3 | Status: SHIPPED | OUTPATIENT
Start: 2019-04-11 | End: 2020-04-12

## 2019-05-14 ENCOUNTER — TELEPHONE (OUTPATIENT)
Dept: INTERNAL MEDICINE CLINIC | Facility: CLINIC | Age: 84
End: 2019-05-14

## 2019-05-14 DIAGNOSIS — F41.9 ANXIETY: Primary | ICD-10-CM

## 2019-05-14 RX ORDER — CITALOPRAM 10 MG/1
10 TABLET ORAL DAILY
Qty: 30 TABLET | Refills: 5 | Status: SHIPPED | OUTPATIENT
Start: 2019-05-14 | End: 2019-11-24 | Stop reason: SDUPTHER

## 2019-05-16 ENCOUNTER — OFFICE VISIT (OUTPATIENT)
Dept: PODIATRY | Facility: CLINIC | Age: 84
End: 2019-05-16
Payer: COMMERCIAL

## 2019-05-16 VITALS
SYSTOLIC BLOOD PRESSURE: 138 MMHG | WEIGHT: 182 LBS | DIASTOLIC BLOOD PRESSURE: 76 MMHG | HEIGHT: 66 IN | BODY MASS INDEX: 29.25 KG/M2 | HEART RATE: 70 BPM

## 2019-05-16 DIAGNOSIS — L84 CORNS: ICD-10-CM

## 2019-05-16 DIAGNOSIS — L60.3 NAIL DYSTROPHY: ICD-10-CM

## 2019-05-16 DIAGNOSIS — I73.9 PERIPHERAL VASCULAR DISEASE, UNSPECIFIED (HCC): Primary | ICD-10-CM

## 2019-05-16 PROCEDURE — 99213 OFFICE O/P EST LOW 20 MIN: CPT | Performed by: PODIATRIST

## 2019-05-16 RX ORDER — DORZOLAMIDE HYDROCHLORIDE AND TIMOLOL MALEATE 20; 5 MG/ML; MG/ML
SOLUTION/ DROPS OPHTHALMIC
COMMUNITY
Start: 2013-05-14 | End: 2020-02-06 | Stop reason: SDUPTHER

## 2019-06-03 ENCOUNTER — TRANSITIONAL CARE MANAGEMENT (OUTPATIENT)
Dept: INTERNAL MEDICINE CLINIC | Facility: CLINIC | Age: 84
End: 2019-06-03

## 2019-06-07 ENCOUNTER — OFFICE VISIT (OUTPATIENT)
Dept: INTERNAL MEDICINE CLINIC | Facility: CLINIC | Age: 84
End: 2019-06-07
Payer: COMMERCIAL

## 2019-06-07 VITALS
WEIGHT: 168 LBS | HEIGHT: 66 IN | TEMPERATURE: 97.2 F | SYSTOLIC BLOOD PRESSURE: 118 MMHG | DIASTOLIC BLOOD PRESSURE: 58 MMHG | BODY MASS INDEX: 27 KG/M2 | OXYGEN SATURATION: 95 % | HEART RATE: 54 BPM

## 2019-06-07 DIAGNOSIS — N18.30 BENIGN HYPERTENSION WITH CHRONIC KIDNEY DISEASE, STAGE III (HCC): ICD-10-CM

## 2019-06-07 DIAGNOSIS — R26.2 AMBULATORY DYSFUNCTION: ICD-10-CM

## 2019-06-07 DIAGNOSIS — I25.10 ARTERIOSCLEROTIC CARDIOVASCULAR DISEASE: Primary | ICD-10-CM

## 2019-06-07 DIAGNOSIS — E78.00 PURE HYPERCHOLESTEROLEMIA: ICD-10-CM

## 2019-06-07 DIAGNOSIS — I12.9 BENIGN HYPERTENSION WITH CHRONIC KIDNEY DISEASE, STAGE III (HCC): ICD-10-CM

## 2019-06-07 DIAGNOSIS — I50.41 ACUTE COMBINED SYSTOLIC AND DIASTOLIC CONGESTIVE HEART FAILURE (HCC): ICD-10-CM

## 2019-06-07 DIAGNOSIS — I48.0 PAROXYSMAL ATRIAL FIBRILLATION (HCC): ICD-10-CM

## 2019-06-07 PROCEDURE — 1160F RVW MEDS BY RX/DR IN RCRD: CPT | Performed by: INTERNAL MEDICINE

## 2019-06-07 PROCEDURE — 99495 TRANSJ CARE MGMT MOD F2F 14D: CPT | Performed by: INTERNAL MEDICINE

## 2019-06-07 PROCEDURE — 1111F DSCHRG MED/CURRENT MED MERGE: CPT | Performed by: INTERNAL MEDICINE

## 2019-06-07 RX ORDER — POTASSIUM CHLORIDE 20 MEQ/1
20 TABLET, EXTENDED RELEASE ORAL DAILY
Refills: 0 | COMMUNITY
Start: 2019-05-31 | End: 2019-08-13 | Stop reason: SDUPTHER

## 2019-06-07 RX ORDER — FUROSEMIDE 20 MG/1
20 TABLET ORAL DAILY
COMMUNITY
Start: 2019-06-06 | End: 2019-08-13 | Stop reason: SDUPTHER

## 2019-06-07 RX ORDER — GUAIFENESIN 600 MG
600 TABLET, EXTENDED RELEASE 12 HR ORAL 2 TIMES DAILY PRN
COMMUNITY
Start: 2019-05-31 | End: 2020-05-08 | Stop reason: SDUPTHER

## 2019-07-02 ENCOUNTER — TELEPHONE (OUTPATIENT)
Dept: INTERNAL MEDICINE CLINIC | Facility: CLINIC | Age: 84
End: 2019-07-02

## 2019-07-02 DIAGNOSIS — R26.2 AMBULATORY DYSFUNCTION: Primary | ICD-10-CM

## 2019-07-08 ENCOUNTER — TELEPHONE (OUTPATIENT)
Dept: INTERNAL MEDICINE CLINIC | Facility: CLINIC | Age: 84
End: 2019-07-08

## 2019-07-08 DIAGNOSIS — R26.2 AMBULATORY DYSFUNCTION: Primary | ICD-10-CM

## 2019-07-08 NOTE — TELEPHONE ENCOUNTER
Fidencio Whitmore states that they put in the order for the walker, but it doesn't include the tray and they need  They need a script written for it and faxed to [phone]

## 2019-07-08 NOTE — PROGRESS NOTES
Patient called back to say they need the order to have a tray included  Wrote up order to include the tray please sign off when you get a chance

## 2019-07-11 ENCOUNTER — TELEPHONE (OUTPATIENT)
Dept: INTERNAL MEDICINE CLINIC | Facility: CLINIC | Age: 84
End: 2019-07-11

## 2019-08-08 ENCOUNTER — OFFICE VISIT (OUTPATIENT)
Dept: PODIATRY | Facility: CLINIC | Age: 84
End: 2019-08-08
Payer: COMMERCIAL

## 2019-08-08 VITALS
DIASTOLIC BLOOD PRESSURE: 80 MMHG | SYSTOLIC BLOOD PRESSURE: 147 MMHG | HEART RATE: 71 BPM | HEIGHT: 66 IN | WEIGHT: 170.4 LBS | BODY MASS INDEX: 27.38 KG/M2

## 2019-08-08 DIAGNOSIS — L84 CORNS: ICD-10-CM

## 2019-08-08 DIAGNOSIS — L60.3 NAIL DYSTROPHY: ICD-10-CM

## 2019-08-08 DIAGNOSIS — I73.9 PERIPHERAL VASCULAR DISEASE, UNSPECIFIED (HCC): Primary | ICD-10-CM

## 2019-08-08 LAB
ALBUMIN SERPL-MCNC: 3.7 G/DL (ref 3.6–5.1)
ALBUMIN/GLOB SERPL: 1.5 (CALC) (ref 1–2.5)
ALP SERPL-CCNC: 106 U/L (ref 33–130)
ALT SERPL-CCNC: 24 U/L (ref 6–29)
APPEARANCE UR: CLEAR
AST SERPL-CCNC: 31 U/L (ref 10–35)
BACTERIA UR QL AUTO: ABNORMAL /HPF
BASOPHILS # BLD AUTO: 29 CELLS/UL (ref 0–200)
BASOPHILS NFR BLD AUTO: 0.9 %
BILIRUB SERPL-MCNC: 0.4 MG/DL (ref 0.2–1.2)
BILIRUB UR QL STRIP: NEGATIVE
BUN SERPL-MCNC: 21 MG/DL (ref 7–25)
BUN/CREAT SERPL: 17 (CALC) (ref 6–22)
CALCIUM SERPL-MCNC: 9.5 MG/DL (ref 8.6–10.4)
CHLORIDE SERPL-SCNC: 110 MMOL/L (ref 98–110)
CHOLEST SERPL-MCNC: 182 MG/DL
CHOLEST/HDLC SERPL: 3.6 (CALC)
CO2 SERPL-SCNC: 27 MMOL/L (ref 20–32)
COLOR UR: YELLOW
CREAT SERPL-MCNC: 1.27 MG/DL (ref 0.6–0.88)
EOSINOPHIL # BLD AUTO: 131 CELLS/UL (ref 15–500)
EOSINOPHIL NFR BLD AUTO: 4.1 %
ERYTHROCYTE [DISTWIDTH] IN BLOOD BY AUTOMATED COUNT: 12.2 % (ref 11–15)
GLOBULIN SER CALC-MCNC: 2.5 G/DL (CALC) (ref 1.9–3.7)
GLUCOSE SERPL-MCNC: 81 MG/DL (ref 65–99)
GLUCOSE UR QL STRIP: NEGATIVE
HCT VFR BLD AUTO: 32.7 % (ref 35–45)
HDLC SERPL-MCNC: 51 MG/DL
HGB BLD-MCNC: 10.3 G/DL (ref 11.7–15.5)
HGB UR QL STRIP: NEGATIVE
HYALINE CASTS #/AREA URNS LPF: ABNORMAL /LPF
KETONES UR QL STRIP: NEGATIVE
LDLC SERPL CALC-MCNC: 115 MG/DL (CALC)
LEUKOCYTE ESTERASE UR QL STRIP: ABNORMAL
LYMPHOCYTES # BLD AUTO: 1005 CELLS/UL (ref 850–3900)
LYMPHOCYTES NFR BLD AUTO: 31.4 %
MCH RBC QN AUTO: 30.3 PG (ref 27–33)
MCHC RBC AUTO-ENTMCNC: 31.5 G/DL (ref 32–36)
MCV RBC AUTO: 96.2 FL (ref 80–100)
MONOCYTES # BLD AUTO: 493 CELLS/UL (ref 200–950)
MONOCYTES NFR BLD AUTO: 15.4 %
NEUTROPHILS # BLD AUTO: 1542 CELLS/UL (ref 1500–7800)
NEUTROPHILS NFR BLD AUTO: 48.2 %
NITRITE UR QL STRIP: NEGATIVE
NONHDLC SERPL-MCNC: 131 MG/DL (CALC)
PH UR STRIP: 6 [PH] (ref 5–8)
PLATELET # BLD AUTO: 159 THOUSAND/UL (ref 140–400)
PMV BLD REES-ECKER: 11 FL (ref 7.5–12.5)
POTASSIUM SERPL-SCNC: 4.2 MMOL/L (ref 3.5–5.3)
PROT SERPL-MCNC: 6.2 G/DL (ref 6.1–8.1)
PROT UR QL STRIP: NEGATIVE
RBC # BLD AUTO: 3.4 MILLION/UL (ref 3.8–5.1)
RBC #/AREA URNS HPF: ABNORMAL /HPF
SL AMB EGFR AFRICAN AMERICAN: 43 ML/MIN/1.73M2
SL AMB EGFR NON AFRICAN AMERICAN: 37 ML/MIN/1.73M2
SODIUM SERPL-SCNC: 142 MMOL/L (ref 135–146)
SP GR UR STRIP: 1.01 (ref 1–1.03)
SQUAMOUS #/AREA URNS HPF: ABNORMAL /HPF
TRIGL SERPL-MCNC: 65 MG/DL
TSH SERPL-ACNC: 3.78 MIU/L (ref 0.4–4.5)
WBC # BLD AUTO: 3.2 THOUSAND/UL (ref 3.8–10.8)
WBC #/AREA URNS HPF: ABNORMAL /HPF

## 2019-08-08 PROCEDURE — 99212 OFFICE O/P EST SF 10 MIN: CPT | Performed by: PODIATRIST

## 2019-08-08 NOTE — PROGRESS NOTES
Patient presents for palliative foot care  Patient has known poor circulation with no palpable pedal pulses  Hyperkeratotic lesions are present on the plantar aspect of each foot and they were trimmed  No evidence of ulceration  All elongated toenails were trimmed  Patient is rescheduled in 10 weeks

## 2019-08-13 ENCOUNTER — OFFICE VISIT (OUTPATIENT)
Dept: INTERNAL MEDICINE CLINIC | Facility: CLINIC | Age: 84
End: 2019-08-13
Payer: COMMERCIAL

## 2019-08-13 VITALS
SYSTOLIC BLOOD PRESSURE: 130 MMHG | DIASTOLIC BLOOD PRESSURE: 64 MMHG | HEIGHT: 66 IN | WEIGHT: 179.4 LBS | TEMPERATURE: 96.9 F | OXYGEN SATURATION: 98 % | BODY MASS INDEX: 28.83 KG/M2 | HEART RATE: 68 BPM

## 2019-08-13 DIAGNOSIS — I25.10 ARTERIOSCLEROTIC CARDIOVASCULAR DISEASE: Primary | ICD-10-CM

## 2019-08-13 DIAGNOSIS — I48.0 PAROXYSMAL ATRIAL FIBRILLATION (HCC): ICD-10-CM

## 2019-08-13 DIAGNOSIS — I50.41 ACUTE COMBINED SYSTOLIC AND DIASTOLIC CONGESTIVE HEART FAILURE (HCC): ICD-10-CM

## 2019-08-13 DIAGNOSIS — I12.9 BENIGN HYPERTENSION WITH CHRONIC KIDNEY DISEASE, STAGE III (HCC): ICD-10-CM

## 2019-08-13 DIAGNOSIS — N18.30 BENIGN HYPERTENSION WITH CHRONIC KIDNEY DISEASE, STAGE III (HCC): ICD-10-CM

## 2019-08-13 DIAGNOSIS — Z00.00 MEDICARE ANNUAL WELLNESS VISIT, SUBSEQUENT: ICD-10-CM

## 2019-08-13 PROCEDURE — 1036F TOBACCO NON-USER: CPT | Performed by: INTERNAL MEDICINE

## 2019-08-13 PROCEDURE — 1160F RVW MEDS BY RX/DR IN RCRD: CPT | Performed by: INTERNAL MEDICINE

## 2019-08-13 PROCEDURE — 1170F FXNL STATUS ASSESSED: CPT | Performed by: INTERNAL MEDICINE

## 2019-08-13 PROCEDURE — 1125F AMNT PAIN NOTED PAIN PRSNT: CPT | Performed by: INTERNAL MEDICINE

## 2019-08-13 PROCEDURE — G0439 PPPS, SUBSEQ VISIT: HCPCS | Performed by: INTERNAL MEDICINE

## 2019-08-13 PROCEDURE — 99214 OFFICE O/P EST MOD 30 MIN: CPT | Performed by: INTERNAL MEDICINE

## 2019-08-13 RX ORDER — POTASSIUM CHLORIDE 20 MEQ/1
20 TABLET, EXTENDED RELEASE ORAL DAILY
Qty: 30 TABLET | Refills: 3 | Status: SHIPPED | OUTPATIENT
Start: 2019-08-13 | End: 2020-07-06

## 2019-08-13 RX ORDER — FUROSEMIDE 20 MG/1
20 TABLET ORAL DAILY
Qty: 30 TABLET | Refills: 2 | Status: SHIPPED | OUTPATIENT
Start: 2019-08-13 | End: 2019-12-03 | Stop reason: SDUPTHER

## 2019-08-13 NOTE — PROGRESS NOTES
Assessment and Plan:     Problem List Items Addressed This Visit     None         History of Present Illness:     Patient presents for Medicare Annual Wellness visit    Patient Care Team:  Steve Brunner, DO as PCP - General  Bette Lambing, MD Steve Brunner, DO     Problem List:     Patient Active Problem List   Diagnosis    Arteriosclerotic cardiovascular disease    Benign hypertension with chronic kidney disease, stage III ([address] )    Hyperlipidemia    Paroxysmal atrial fibrillation (HCC)    Rapid weight loss    Overweight (BMI 25 0-29  9)    Anxiety    Ambulatory dysfunction    Acute combined systolic and diastolic congestive heart failure [facility])      Past Medical and Surgical History:     No past medical history on file    Past Surgical History:   Procedure Laterality Date    APPENDECTOMY      CARDIAC PACEMAKER PLACEMENT      CARDIAC SURGERY      NECK SURGERY      REDUCTION MAMMAPLASTY Bilateral     REPLACEMENT TOTAL KNEE        Family History:     Family History   Problem Relation Age of Onset    Other Father         Malignant neoplasm of brain    Aneurysm Brother         Abdominal aortic aneurysm repair for dilation or occlusion; Malignant neoplasm of brain      Social History:     Social History     Tobacco Use   Smoking Status Never Smoker   Smokeless Tobacco Never Used     Social History     Substance and Sexual Activity   Alcohol Use Yes    Comment: Social     Social History     Substance and Sexual Activity   Drug Use No      Medications and Allergies:     Current Outpatient Medications   Medication Sig Dispense Refill    amLODIPine (NORVASC) 5 mg tablet Take 1 tablet (5 mg total) by mouth daily 90 tablet 3    apixaban (ELIQUIS) 2 5 mg TAKE 1 TABLET BY MOUTH 2 TIMES A DAY      aspirin (ECOTRIN LOW STRENGTH) 81 mg EC tablet Take 81 mg by mouth daily      atenolol (TENORMIN) 50 mg tablet Take 1 tablet (50 mg total) by mouth daily 90 tablet 3    atorvastatin (LIPITOR) 10 mg tablet Take 1 tablet (10 mg total) by mouth daily 90 tablet 3    bimatoprost (LUMIGAN) 0 01 % ophthalmic drops 1 drop in both eyes at bedtime      cholecalciferol (VITAMIN D3) 1,000 units tablet Take 2,000 Units by mouth      citalopram (CeleXA) 10 mg tablet Take 1 tablet (10 mg total) by mouth daily 30 tablet 5    cyanocobalamin 1000 MCG tablet Take 1,000 mcg by mouth      dorzolamide-timolol (COSOPT) 22 3-6 8 MG/ML ophthalmic solution Apply to eye      ELIQUIS 2 5 MG   1    ferrous sulfate (RA IRON) 325 (65 Fe) mg tablet Take 1 tablet (325 mg total) by mouth daily 30 tablet 5    furosemide (LASIX) 20 mg tablet Take 20 mg by mouth daily      guaiFENesin (MUCINEX) 600 mg 12 hr tablet Take 600 mg by mouth 2 (two) times a day as needed      LUMIGAN 0 01 % ophthalmic drops   0    Misc  Devices (TRANSPORT CHAIR) MISC by Does not apply route as needed (Lower extremity weakness) Patient does have a history of degenerative arthritis and she does have problems with lower extremity weakness, via ambulatory dysfunction  Patient also has a history of coronary artery disease and is not able 1 each 0    omeprazole (PriLOSEC) 20 mg delayed release capsule Take 20 mg by mouth daily      potassium chloride (K-DUR,KLOR-CON) 20 mEq tablet Take 20 mEq by mouth daily  0     No current facility-administered medications for this visit  No Known Allergies   Immunizations:     Immunization History   Administered Date(s) Administered    Tdap 09/04/2013      Medicare Screening Tests and Risk Assessments:     Aleksander Vasquez is here for her Subsequent Wellness visit  Health Risk Assessment:  Patient rates overall health as good  Patient feels that their physical health rating is Same  Eyesight was rated as Slightly worse  Hearing was rated as Same  Patient feels that their emotional and mental health rating is Same  Pain experienced by patient in the last 7 days has been None       Emotional/Mental Health:  Patient has not been feeling nervous/anxious  PHQ-9 Depression Screening:    Frequency of the following problems over the past two weeks:      1  Little interest or pleasure in doing things: 0 - not at all      2  Feeling down, depressed, or hopeless: 0 - not at all  PHQ-2 Score: 0          Broken Bones/Falls: Fall Risk Assessment:    In the past year, patient has experienced: No history of falling in past year          Bladder/Bowel:  Patient has leaked urine accidently in the last six months  Patient reports no loss of bowel control  Immunizations:  Patient has not had a flu vaccination within the last year  Patient has received a pneumonia shot  Patient has received a shingles shot  Patient has received tetanus/diphtheria shot  Home Safety:  Patient does not have trouble with stairs inside or outside of their home  Patient currently reports that there are no safety hazards present in home, working smoke alarms, working carbon monoxide detectors  Preventative Screenings:   Breast cancer screening performed, colon cancer screen completed, cholesterol screen completed, glaucoma eye exam completed,     Nutrition:  Current diet: No Added Salt with servings of the following:    Medications:  Patient is currently taking over-the-counter supplements  Patient is able to manage medications  Lifestyle Choices:  Patient reports no tobacco use  Patient has not smoked or used tobacco in the past   Patient reports no alcohol use  Patient does not drive a vehicle  Patient wears seat belt          Activities of Daily Living:  Can get out of bed by his or her self, able to dress self, able to make own meals, able to do own shopping, able to bathe self, can do own laundry/housekeeping, can manage own money, pay bills and track expenses    Previous Hospitalizations:  Hospitalization or ED visit in past 12 months  Number of hospitalizations within the last year: 1-2        Advanced Directives:  Patient has decided on a power of   Patient has spoken to designated power of   Patient has completed advanced directive  Preventative Screening/Counseling:      Cardiovascular:      General: Risks and Benefits Discussed and Screening Current          Diabetes:      General: Risks and Benefits Discussed and Screening Current          Colorectal Cancer:      General: Screening Not Indicated          Breast Cancer:      General: Screening Not Indicated          Cervical Cancer:      General: Screening Not Indicated          Osteoporosis:      General: Screening Current          AAA:      General: Screening Not Indicated          Glaucoma:      General: Risks and Benefits Discussed and Screening Current          HIV:      General: Screening Not Indicated          Hepatitis C:      General: Screening Not Indicated        Advanced Directives:   Patient has living will for healthcare, has durable POA for healthcare, patient has an advanced directive  Information on ACP and/or AD provided  No 5 wishes given  End of life assessment reviewed with patient  Provider agrees with end of life decisions        Immunizations:      Influenza: Patient Declines      Pneumococcal: Patient Declines      Shingrix: Patient Declines      Hepatitis B (Low risk patients): Series Not Indicated      Zostavax: Patient Declines      TD: Vaccine Status Unknown      TDAP: Vaccine Status Unknown      Other Preventative Counseling (Non-Medicare):  Alcohol Use, Fall Prevention, Helmet Safety, Increase physical activity, Nutrition Counseling, Car/seat belt/driving safety reviewed, Skin self-exam, Sunscreen use, Dietary education for weight gain and Weight reduction discussed

## 2019-08-13 NOTE — PROGRESS NOTES
Assessment/Plan:    Medicare annual wellness visit, subsequent  Patient is here today for repeat Medicare wellness visit  Patient apparently was recently in the hospital for an episode of acute congestive heart failure  She did have full workup and evaluation, diuresed and was placed on a low dose of Lasix post discharge  Patient also had visiting nurses and help at home for short period of time but this has now stopped  Patient as mentioned was on a low dose of Lasix but ran out of the pill and is unsure whether she needs to continue the medication  She has not been contacted for an office visit with her cardiologist post discharge  She states that she is beginning to develop symptoms were similar to those that brought her to the hospital originally including some increasing cough and chest congestion  She also relates that she is having some increasing swelling to the feet and ankles which was noticed by the family  They did not contact her cardiologist regarding this  Otherwise with her hospitalization patient is up-to-date with all routine labs  Because of her age she is not a candidate for colon rectal screening, mammogram or gynecologic care  She is accompanied to the visit today by her niece    Benign hypertension with chronic kidney disease, stage III  Patient has a history of hypertension with chronic kidney disease stage 3  Again patient was placed on a low-dose diuretic during her hospitalization and she was given no instructions once the prescription was finished as to whether to continue with medication  Because she is showing some increased swelling to the feet and ankles and evidence similar to what brought her to the emergency room originally which was congestive heart failure she will restart her Lasix at 10 mg daily along with potassium supplement  We will be checking on a basic metabolic profile in approximately 3 weeks prior to her next visit      Arteriosclerotic cardiovascular disease  Again patient has a history of atherosclerotic cardiovascular disease status post bypass surgery in the past   Patient during hospitalization had no evidence of acute ischemia  Patient does not have a follow-up visit with Cardiology until next year  Acute combined systolic and diastolic congestive heart failure (HCC)  Wt Readings from Last 3 Encounters:   08/13/19 81 4 kg (179 lb 6 4 oz)   08/08/19 77 3 kg (170 lb 6 4 oz)   06/07/19 76 2 kg (168 lb)     Patient has had a slow but steady increase in her weight especially since she stopped her diuretic  Patient has symptoms similar to those the border to the hospital recently for congestive heart failure  Again we will restart a low-dose diuretic  We will continue to monitor her kidney function  Paroxysmal atrial fibrillation (HCC)  Heart rate is controlled, rhythm is stable to today in the office  Patient continues on oral anticoagulants  Diagnoses and all orders for this visit:    Arteriosclerotic cardiovascular disease  -     furosemide (LASIX) 20 mg tablet; Take 1 tablet (20 mg total) by mouth daily  -     potassium chloride (K-DUR,KLOR-CON) 20 mEq tablet; Take 1 tablet (20 mEq total) by mouth daily  -     Basic metabolic panel; Future  -     CBC and differential; Future    Acute combined systolic and diastolic congestive heart failure (HCC)  -     furosemide (LASIX) 20 mg tablet; Take 1 tablet (20 mg total) by mouth daily  -     potassium chloride (K-DUR,KLOR-CON) 20 mEq tablet; Take 1 tablet (20 mEq total) by mouth daily  -     Basic metabolic panel; Future    Benign hypertension with chronic kidney disease, stage III (HCC)  -     CBC and differential; Future    Medicare annual wellness visit, subsequent    [facility] )          Subjective:      Patient ID: Anton Yi is a 80 y o  female  Patient is a 35-year-old female with a history of multiple medical problems as outlined previously    Patient is here today for repeat Medicare wellness visit  Patient states that recently she was hospitalized for an episode of congestive heart failure  She was placed on a low-dose diuretic and continues to have recurrence of symptoms because the diuretic was stopped when the prescription ran out  Patient did not follow up with her cardiologist because no appointment was made  Patient is beginning to have some increased swelling to the feet and ankles, some cough which was symptom of her congestive heart failure  The following portions of the patient's history were reviewed and updated as appropriate:   She  has no past medical history on file  She   Patient Active Problem List    Diagnosis Date Noted    Medicare annual wellness visit, subsequent 08/13/2019    Ambulatory dysfunction 06/07/2019    [facility] 06/07/2019    Anxiety 05/14/2019    Overweight (BMI 25 0-29 9) 04/03/2019    Rapid weight loss 06/28/2018    Paroxysmal atrial fibrillation ([facility] ) 09/05/2017    Benign hypertension with chronic kidney disease, stage III ([place] [address] ) 06/09/2017    Hyperlipidemia 06/24/2013    Arteriosclerotic cardiovascular disease 11/12/2012     She  has a past surgical history that includes Appendectomy; Reduction mammaplasty (Bilateral); Cardiac surgery; Replacement total knee; Neck surgery; and Cardiac pacemaker placement  Her family history includes Aneurysm in her brother; Other in her father  She  reports that she has never smoked  She has never used smokeless tobacco  She reports that she drinks alcohol  She reports that she does not use drugs    Current Outpatient Medications   Medication Sig Dispense Refill    amLODIPine (NORVASC) 5 mg tablet Take 1 tablet (5 mg total) by mouth daily 90 tablet 3    apixaban (ELIQUIS) 2 5 mg TAKE 1 TABLET BY MOUTH 2 TIMES A DAY      aspirin (ECOTRIN LOW STRENGTH) 81 mg EC tablet Take 81 mg by mouth daily      atenolol (TENORMIN) 50 mg tablet Take 1 tablet (50 mg total) by mouth daily 90 tablet 3    atorvastatin (LIPITOR) 10 mg tablet Take 1 tablet (10 mg total) by mouth daily 90 tablet 3    bimatoprost (LUMIGAN) 0 01 % ophthalmic drops 1 drop in both eyes at bedtime      cholecalciferol (VITAMIN D3) 1,000 units tablet Take 2,000 Units by mouth      citalopram (CeleXA) 10 mg tablet Take 1 tablet (10 mg total) by mouth daily 30 tablet 5    cyanocobalamin 1000 MCG tablet Take 1,000 mcg by mouth      dorzolamide-timolol (COSOPT) 22 3-6 8 MG/ML ophthalmic solution Apply to eye      ELIQUIS 2 5 MG   1    ferrous sulfate (RA IRON) 325 (65 Fe) mg tablet Take 1 tablet (325 mg total) by mouth daily 30 tablet 5    furosemide (LASIX) 20 mg tablet Take 1 tablet (20 mg total) by mouth daily 30 tablet 2    guaiFENesin (MUCINEX) 600 mg 12 hr tablet Take 600 mg by mouth 2 (two) times a day as needed      LUMIGAN 0 01 % ophthalmic drops   0    Misc  Devices (TRANSPORT CHAIR) MISC by Does not apply route as needed (Lower extremity weakness) Patient does have a history of degenerative arthritis and she does have problems with lower extremity weakness, via ambulatory dysfunction  Patient also has a history of coronary artery disease and is not able 1 each 0    omeprazole (PriLOSEC) 20 mg delayed release capsule Take 20 mg by mouth daily      potassium chloride (K-DUR,KLOR-CON) 20 mEq tablet Take 1 tablet (20 mEq total) by mouth daily 30 tablet 3     No current facility-administered medications for this visit        Current Outpatient Medications on File Prior to Visit   Medication Sig    amLODIPine (NORVASC) 5 mg tablet Take 1 tablet (5 mg total) by mouth daily    apixaban (ELIQUIS) 2 5 mg TAKE 1 TABLET BY MOUTH 2 TIMES A DAY    aspirin (ECOTRIN LOW STRENGTH) 81 mg EC tablet Take 81 mg by mouth daily    atenolol (TENORMIN) 50 mg tablet Take 1 tablet (50 mg total) by mouth daily    atorvastatin (LIPITOR) 10 mg tablet Take 1 tablet (10 mg total) by mouth daily    bimatoprost (LUMIGAN) 0 01 % ophthalmic drops 1 drop in both eyes at bedtime    cholecalciferol (VITAMIN D3) 1,000 units tablet Take 2,000 Units by mouth    citalopram (CeleXA) 10 mg tablet Take 1 tablet (10 mg total) by mouth daily    cyanocobalamin 1000 MCG tablet Take 1,000 mcg by mouth    dorzolamide-timolol (COSOPT) 22 3-6 8 MG/ML ophthalmic solution Apply to eye    ELIQUIS 2 5 MG     ferrous sulfate (RA IRON) 325 (65 Fe) mg tablet Take 1 tablet (325 mg total) by mouth daily    guaiFENesin (MUCINEX) 600 mg 12 hr tablet Take 600 mg by mouth 2 (two) times a day as needed    LUMIGAN 0 01 % ophthalmic drops     Misc  Devices (TRANSPORT CHAIR) MISC by Does not apply route as needed (Lower extremity weakness) Patient does have a history of degenerative arthritis and she does have problems with lower extremity weakness, via ambulatory dysfunction  Patient also has a history of coronary artery disease and is not able    omeprazole (PriLOSEC) 20 mg delayed release capsule Take 20 mg by mouth daily    [DISCONTINUED] furosemide (LASIX) 20 mg tablet Take 20 mg by mouth daily    [DISCONTINUED] potassium chloride (K-DUR,KLOR-CON) 20 mEq tablet Take 20 mEq by mouth daily     No current facility-administered medications on file prior to visit  She has No Known Allergies       Review of Systems   Constitutional: Positive for activity change (Limited in activity level secondary to her diffuse degenerative arthritis)  Negative for appetite change, chills, diaphoresis, fatigue, fever and unexpected weight change  HENT: Positive for congestion ( complaining of nasal congestion)  Negative for dental problem, drooling, ear discharge, ear pain, facial swelling, hearing loss, mouth sores, nosebleeds, postnasal drip, rhinorrhea, sinus pressure, sinus pain, sneezing, sore throat, tinnitus, trouble swallowing and voice change  Eyes: Negative      Respiratory: Positive for shortness of breath ( chronic shortness of breath with brisk exertion but no increase recently)  Negative for apnea, cough, choking, chest tightness, wheezing and stridor  Cardiovascular: Positive for leg swelling ( over the past few weeks patient has had some increased swelling to the feet and ankles)  Negative for chest pain and palpitations  Gastrointestinal: Negative  Endocrine: Negative  Genitourinary: Negative  Musculoskeletal: Positive for arthralgias, back pain and gait problem  Negative for joint swelling, myalgias, neck pain and neck stiffness  Skin: Negative  Allergic/Immunologic: Negative  Neurological: Negative for dizziness, tremors, seizures, syncope, facial asymmetry, speech difficulty, weakness, light-headedness, numbness and headaches  Hematological: Negative  Psychiatric/Behavioral: Negative  Objective:      /64   Pulse 68   Temp (!) 96 9 °F (36 1 °C)   Ht 5' 6" (1 676 m)   Wt 81 4 kg (179 lb 6 4 oz)   SpO2 98%   BMI 28 96 kg/m²          Physical Exam   Constitutional: She is oriented to person, place, and time  She appears well-developed and well-nourished  No distress  Pleasant, soft-spoken 57-year-old female who is awake alert at this point in no acute distress, very slow antalgic gait walking with a walker  Accompanied to the appointment today by her niece   Alenneerajmarcus Hunter:   Head: Normocephalic and atraumatic  Right Ear: External ear normal    Left Ear: External ear normal    Mouth/Throat: Oropharynx is clear and moist  No oropharyngeal exudate  Patient does have some enlargement to her nasal turbinates bilaterally with clear nasal discharge  Turbinates are blue in coloration   Eyes: Pupils are equal, round, and reactive to light  Conjunctivae and EOM are normal  Right eye exhibits no discharge  Left eye exhibits no discharge  No scleral icterus  Neck: Normal range of motion  Neck supple  No JVD present  No tracheal deviation present  No thyromegaly present  Cardiovascular: Normal rate and regular rhythm  Exam reveals no gallop and no friction rub  Murmur heard  Pulmonary/Chest: Effort normal  No stridor  No respiratory distress  She has no wheezes  She has no rales  She exhibits no tenderness  Some decreased breath sounds anteriorly and posteriorly but no rales rhonchi or wheezes could be appreciated on exam   Abdominal: Soft  Bowel sounds are normal  She exhibits no distension and no mass  There is no tenderness  There is no rebound and no guarding  No hernia  Musculoskeletal: She exhibits edema and deformity  She exhibits no tenderness  Patient does have a trace edema bilateral lower extremity which she states was completely absent with discharge from the hospital and prior to stopping the diuretics  Patient does have diffuse arthritic changes throughout but nothing acute   Lymphadenopathy:     She has no cervical adenopathy  Neurological: She is alert and oriented to person, place, and time  She displays abnormal reflex ( absent patella and Achilles tendon reflexes bilaterally)  No cranial nerve deficit or sensory deficit  She exhibits abnormal muscle tone ( some mild decreased tone and strength to both upper lower extremities)  Coordination (Difficulties with coordination, walking with a walker secondary to degenerative arthritis) abnormal    Skin: Skin is warm and dry  No rash noted  She is not diaphoretic  No erythema  There is pallor  Psychiatric: She has a normal mood and affect  Her behavior is normal  Judgment and thought content normal    Nursing note and vitals reviewed

## 2019-08-13 NOTE — PATIENT INSTRUCTIONS
Obesity   AMBULATORY CARE:   Obesity  is when your body mass index (BMI) is greater than 30  Your healthcare provider will use your height and weight to measure your BMI  The risks of obesity include  many health problems, such as injuries or physical disability  You may need tests to check for the following:  · Diabetes     · High blood pressure or high cholesterol     · Heart disease     · Gallbladder or liver disease     · Cancer of the colon, breast, prostate, liver, or kidney     · Sleep apnea     · Arthritis or gout  Seek care immediately if:   · You have a severe headache, confusion, or difficulty speaking  · You have weakness on one side of your body  · You have chest pain, sweating, or shortness of breath  Contact your healthcare provider if:   · You have symptoms of gallbladder or liver disease, such as pain in your upper abdomen  · You have knee or hip pain and discomfort while walking  · You have symptoms of diabetes, such as intense hunger and thirst, and frequent urination  · You have symptoms of sleep apnea, such as snoring or daytime sleepiness  · You have questions or concerns about your condition or care  Treatment for obesity  focuses on helping you lose weight to improve your health  Even a small decrease in BMI can reduce the risk for many health problems  Your healthcare provider will help you set a weight-loss goal   · Lifestyle changes  are the first step in treating obesity  These include making healthy food choices and getting regular physical activity  Your healthcare provider may suggest a weight-loss program that involves coaching, education, and therapy  · Medicine  may help you lose weight when it is used with a healthy diet and physical activity  · Surgery  can help you lose weight if you are very obese and have other health problems  There are several types of weight-loss surgery  Ask your healthcare provider for more information    Be successful losing weight:   · Set small, realistic goals  An example of a small goal is to walk for 20 minutes 5 days a week  Anther goal is to lose 5% of your body weight  · Tell friends, family members, and coworkers about your goals  and ask for their support  Ask a friend to lose weight with you, or join a weight-loss support group  · Identify foods or triggers that may cause you to overeat , and find ways to avoid them  Remove tempting high-calorie foods from your home and workplace  Place a bowl of fresh fruit on your kitchen counter  If stress causes you to eat, then find other ways to cope with stress  · Keep a diary to track what you eat and drink  Also write down how many minutes of physical activity you do each day  Weigh yourself once a week and record it in your diary  Eating changes: You will need to eat 500 to 1,000 fewer calories each day than you currently eat to lose 1 to 2 pounds a week  The following changes will help you cut calories:  · Eat smaller portions  Use small plates, no larger than 9 inches in diameter  Fill your plate half full of fruits and vegetables  Measure your food using measuring cups until you know what a serving size looks like  · Eat 3 meals and 1 or 2 snacks each day  Plan your meals in advance  Samira Lynch and eat at home most of the time  Eat slowly  · Eat fruits and vegetables at every meal   They are low in calories and high in fiber, which makes you feel full  Do not add butter, margarine, or cream sauce to vegetables  Use herbs to season steamed vegetables  · Eat less fat and fewer fried foods  Eat more baked or grilled chicken and fish  These protein sources are lower in calories and fat than red meat  Limit fast food  Dress your salads with olive oil and vinegar instead of bottled dressing  · Limit the amount of sugar you eat  Do not drink sugary beverages  Limit alcohol  Activity changes:  Physical activity is good for your body in many ways   It helps you burn calories and build strong muscles  It decreases stress and depression, and improves your mood  It can also help you sleep better  Talk to your healthcare provider before you begin an exercise program   · Exercise for at least 30 minutes 5 days a week  Start slowly  Set aside time each day for physical activity that you enjoy and that is convenient for you  It is best to do both weight training and an activity that increases your heart rate, such as walking, bicycling, or swimming  · Find ways to be more active  Do yard work and housecleaning  Walk up the stairs instead of using elevators  Spend your leisure time going to events that require walking, such as outdoor festivals or fairs  This extra physical activity can help you lose weight and keep it off  Follow up with your healthcare provider as directed: You may need to meet with a dietitian  Write down your questions so you remember to ask them during your visits  © 2017 [address] Information is for End User's use only and may not be sold, redistributed or otherwise used for commercial purposes  All illustrations and images included in CareNotes® are the copyrighted property of [employer]  or Roosevelt Bradley  The above information is an  only  It is not intended as medical advice for individual conditions or treatments  Talk to your doctor, nurse or pharmacist before following any medical regimen to see if it is safe and effective for you  Urinary Incontinence   WHAT YOU NEED TO KNOW:   What is urinary incontinence? Urinary incontinence (UI) is when you lose control of your bladder  What causes UI? UI occurs because your bladder cannot store or empty urine properly  The following are the most common types of UI:  · Stress incontinence  is when you leak urine due to increased bladder pressure  This may happen when you cough, sneeze, or exercise       · Urge incontinence  is when you feel the need to urinate right away and leak urine accidentally  · Mixed incontinence  is when you have both stress and urge UI  What are the signs and symptoms of UI?   · You feel like your bladder does not empty completely when you urinate  · You urinate often and need to urinate immediately  · You leak urine when you sleep, or you wake up with the urge to urinate  · You leak urine when you cough, sneeze, exercise, or laugh  How is UI diagnosed? Your healthcare provider will ask how often you leak urine and whether you have stress or urge symptoms  Tell him which medicines you take, how often you urinate, and how much liquid you drink each day  You may need any of the following tests:  · Urine tests  may show infection or kidney function  · A pelvic exam  may be done to check for blockages  A pelvic exam will also show if your bladder, uterus, or other organs have moved out of place  · An x-ray, ultrasound, or CT  may show problems with parts of your urinary system  You may be given contrast liquid to help your organs show up better in the pictures  Tell the healthcare provider if you have ever had an allergic reaction to contrast liquid  Do not enter the MRI room with anything metal  Metal can cause serious injury  Tell the healthcare provider if you have any metal in or on your body  · A bladder scan  will show how much urine is left in your bladder after you urinate  You will be asked to urinate and then healthcare providers will use a small ultrasound machine to check the urine left in your bladder  · Cystometry  is used to check the function of your urinary system  Your healthcare provider checks the pressure in your bladder while filling it with fluid  Your bladder pressure may also be tested when your bladder is full and while you urinate  How is UI treated? · Medicines  can help strengthen your bladder control      · Electrical stimulation  is used to send a small amount of electrical energy to your pelvic floor muscles  This helps control your bladder function  Electrodes may be placed outside your body or in your rectum  For women, the electrodes may be placed in the vagina  · A bulking agent  may be injected into the wall of your urethra to make it thicker  This helps keep your urethra closed and decreases urine leakage  · Devices  such as a clamp, pessary, or tampon may help stop urine leaks  Ask your healthcare provider for more information about these and other devices  · Surgery  may be needed if other treatments do not work  Several types of surgery can help improve your bladder control  Ask your healthcare provider for more information about the surgery you may need  How can I manage my symptoms? · Do pelvic muscle exercises often  Your pelvic muscles help you stop urinating  Squeeze these muscles tight for 5 seconds, then relax for 5 seconds  Gradually work up to squeezing for 10 seconds  Do 3 sets of 15 repetitions a day, or as directed  This will help strengthen your pelvic muscles and improve bladder control  · A catheter  may be used to help empty your bladder  A catheter is a tiny, plastic tube that is put into your bladder to drain your urine  Your healthcare provider may tell you to use a catheter to prevent your bladder from getting too full and leaking urine  · Keep a UI record  Write down how often you leak urine and how much you leak  Make a note of what you were doing when you leaked urine  · Train your bladder  Go to the bathroom at set times, such as every 2 hours, even if you do not feel the urge to go  You can also try to hold your urine when you feel the urge to go  For example, hold your urine for 5 minutes when you feel the urge to go  As that becomes easier, hold your urine for 10 minutes  · Drink liquids as directed  Ask your healthcare provider how much liquid to drink each day and which liquids are best for you   You may need to limit the amount of liquid you drink to help control your urine leakage  Limit or do not have drinks that contain caffeine or alcohol  Do not drink any liquid right before you go to bed  · Prevent constipation  Eat a variety of high-fiber foods  Good examples are high-fiber cereals, beans, vegetables, and whole-grain breads  Prune juice may help make your bowel movement softer  Walking is the best way to trigger your intestines to have a bowel movement  · Exercise regularly and maintain a healthy weight  Ask your healthcare provider how much you should weigh and about the best exercise plan for you  Weight loss and exercise will decrease pressure on your bladder and help you control your leakage  Ask him to help you create a weight loss plan if you are overweight  When should I seek immediate care? · You have severe pain  · You are confused or cannot think clearly  When should I contact my healthcare provider? · You have a fever  · You see blood in your urine  · You have pain when you urinate  · You have new or worse pain, even after treatment  · Your mouth feels dry or you have vision changes  · Your urine is cloudy or smells bad  · You have questions or concerns about your condition or care  CARE AGREEMENT:   You have the right to help plan your care  Learn about your health condition and how it may be treated  Discuss treatment options with your caregivers to decide what care you want to receive  You always have the right to refuse treatment  The above information is an  only  It is not intended as medical advice for individual conditions or treatments  Talk to your doctor, nurse or pharmacist before following any medical regimen to see if it is safe and effective for you  © 2017 [facility] Information is for End User's use only and may not be sold, redistributed or otherwise used for commercial purposes   All illustrations and images included in CareNotes® are the copyrighted property of A D A M , Inc  or Roosevelt Bradley  Cigarette Smoking and Your Health   AMBULATORY CARE:   Risks to your health if you smoke:  Nicotine and other chemicals found in tobacco damage every cell in your body  Even if you are a light smoker, you have an increased risk for cancer, heart disease, and lung disease  If you are pregnant or have diabetes, smoking increases your risk for complications  Benefits to your health if you stop smoking:   · You decrease respiratory symptoms such as coughing, wheezing, and shortness of breath  · You reduce your risk for cancers of the lung, mouth, throat, kidney, bladder, pancreas, stomach, and cervix  If you already have cancer, you increase the benefits of chemotherapy  You also reduce your risk for cancer returning or a second cancer from developing  · You reduce your risk for heart disease, blood clots, heart attack, and stroke  · You reduce your risk for lung infections, and diseases such as pneumonia, asthma, chronic bronchitis, and emphysema  · Your circulation improves  More oxygen can be delivered to your body  If you have diabetes, you lower your risk for complications, such as kidney, artery, and eye diseases  You also lower your risk for nerve damage  Nerve damage can lead to amputations, poor vision, and blindness  · You improve your body's ability to heal and to fight infections  Benefits to the health of others if you stop smoking:  Tobacco is harmful to nonsmokers who breathe in your secondhand smoke  The following are ways the health of others around you may improve when you stop smoking:  · You lower the risks for lung cancer and heart disease in nonsmoking adults  · If you are pregnant, you lower the risk for miscarriage, early delivery, low birth weight, and stillbirth  You also lower your baby's risk for SIDS, obesity, developmental delay, and neurobehavioral problems, such as ADHD  · If you have children, you lower their risk for ear infections, colds, pneumonia, bronchitis, and asthma  For more information and support to stop smoking:   · Smokefree  gov  Phone: 3- 196 - 834-2304  Web Address: www smokefree  gov  Follow up with your healthcare provider as directed:  Write down your questions so you remember to ask them during your visits  © 2017 [address] Information is for End User's use only and may not be sold, redistributed or otherwise used for commercial purposes  All illustrations and images included in CareNotes® are the copyrighted property of A [employer] A M , Inc  or Roosevelt Bradley  The above information is an  only  It is not intended as medical advice for individual conditions or treatments  Talk to your doctor, nurse or pharmacist before following any medical regimen to see if it is safe and effective for you  Fall Prevention   AMBULATORY CARE:   Fall prevention  includes ways to make your home and other areas safer  It also includes ways you can move more carefully to prevent a fall  Health conditions that cause changes in your blood pressure, vision, or muscle strength and coordination may increase your risk for falls  Medicines may also increase your risk for falls if they make you dizzy, weak, or sleepy  Call 911 or have someone else call if:   · You have fallen and are unconscious  · You have fallen and cannot move part of your body  Contact your healthcare provider if:   · You have fallen and have pain or a headache  · You have questions or concerns about your condition or care  Fall prevention tips:   · Stand or sit up slowly  This may help you keep your balance and prevent falls  · Use assistive devices as directed  Your healthcare provider may suggest that you use a cane or walker to help you keep your balance  You may need to have grab bars put in your bathroom near the toilet or in the shower      · Wear shoes that fit well and have soles that   Wear shoes both inside and outside  Use slippers with good   Do not wear shoes with high heels  · Wear a personal alarm  This is a device that allows you to call 911 if you fall and need help  Ask your healthcare provider for more information  · Stay active  Exercise can help strengthen your muscles and improve your balance  Your healthcare provider may recommend water aerobics or walking  He or she may also recommend physical therapy to improve your coordination  Never start an exercise program without talking to your healthcare provider first      · Manage your medical conditions  Keep all appointments with your healthcare providers  Visit your eye doctor as directed  Home safety tips:   · Add items to prevent falls in the bathroom  Put nonslip strips on your bath or shower floor to prevent you from slipping  Use a bath mat if you do not have carpet in the bathroom  This will prevent you from falling when you step out of the bath or shower  Use a shower seat so you do not need to stand while you shower  Sit on the toilet or a chair in your bathroom to dry yourself and put on clothing  This will prevent you from losing your balance from drying or dressing yourself while you are standing  · Keep paths clear  Remove books, shoes, and other objects from walkways and stairs  Place cords for telephones and lamps out of the way so that you do not need to walk over them  Tape them down if you cannot move them  Remove small rugs  If you cannot remove a rug, secure it with double-sided tape  This will prevent you from tripping  · Install bright lights in your home  Use night lights to help light paths to the bathroom or kitchen  Always turn on the light before you start walking  · Keep items you use often on shelves within reach  Do not use a step stool to help you reach an item  · Paint or place reflective tape on the edges of your stairs    This will help you see the stairs better  Follow up with your healthcare provider as directed:  Write down your questions so you remember to ask them during your visits  © 2017 [address] Information is for End User's use only and may not be sold, redistributed or otherwise used for commercial purposes  All illustrations and images included in CareNotes® are the copyrighted property of A [employer] M [employer] Inc  or Roosevelt Bradley  The above information is an  only  It is not intended as medical advice for individual conditions or treatments  Talk to your doctor, nurse or pharmacist before following any medical regimen to see if it is safe and effective for you  Advance Directives   WHAT YOU NEED TO KNOW:   What are advance directives? Advance directives are legal documents that state your wishes and plans for medical care  These plans are made ahead of time in case you lose your ability to make decisions for yourself  Advance directives can apply to any medical decision, such as the treatments you want, and if you want to donate organs  What are the types of advance directives? There are many types of advance directives, and each state has rules about how to use them  You may choose a combination of any of the following:  · Living will: This is a written record of the treatment you want  You can also choose which treatments you do not want, which to limit, and which to stop at a certain time  This includes surgery, medicine, IV fluid, and tube feedings  · Durable power of  for healthcare [facility]): This is a written record that states who you want to make healthcare choices for you when you are unable to make them for yourself  This person, called a proxy, is usually a family member or a friend  You may choose more than 1 proxy  · Do not resuscitate (DNR) order:  A DNR order is used in case your heart stops beating or you stop breathing   It is a request not to have certain forms of treatment, such as CPR  A DNR order may be included in other types of advance directives  · Medical directive: This covers the care that you want if you are in a coma, near death, or unable to make decisions for yourself  You can list the treatments you want for each condition  Treatment may include pain medicine, surgery, blood transfusions, dialysis, IV or tube feedings, and a ventilator (breathing machine)  · Values history: This document has questions about your views, beliefs, and how you feel and think about life  This information can help others choose the care that you would choose  Why are advance directives important? An advance directive helps you control your care  Although spoken wishes may be used, it is better to have your wishes written down  Spoken wishes can be misunderstood, or not followed  Treatments may be given even if you do not want them  An advance directive may make it easier for your family to make difficult choices about your care  How do I decide what to put in my advance directives? · Make informed decisions:  Make sure you fully understand treatments or care you may receive  Think about the benefits and problems your decisions could cause for you or your family  Talk to healthcare providers if you have concerns or questions before you write down your wishes  You may also want to talk with your Sabian[employer] or , or a   Check your state laws to make sure that what you put in your advance directive is legal      · Sign all forms:  Sign and date your advance directive when you have finished  You may also need 2 witnesses to sign the forms  Witnesses cannot be your doctor or his staff, your spouse, heirs or beneficiaries, people you owe money to, or your chosen proxy  Talk to your family, proxy, and healthcare providers about your advance directive  Give each person a copy, and keep one for yourself in a place you can get to easily   Do not keep it hidden or locked away  · Review and revise your plans: You can revise your advance directive at any time, as long as you are able to make decisions  Review your plan every year, and when there are changes in your life, or your health  When you make changes, let your family, proxy, and healthcare providers know  Give each a new copy  Where can I find more information? · [employer]  [address] [place] , [address]  Phone: 8- [phone] - [phone]  Phone: 6- 590 - [phone]  Web Address: http://www  aafp org  · [address] [facility])  [address] [address] [facility], [address] [facility] , [address] [place] [address]  Phone: [phone]  Phone: [phone]  Web Address: Mariah stapleton  CARE AGREEMENT:   You have the right to help plan your care  To help with this plan, you must learn about your health condition and treatment options  You must also learn about advance directives and how they are used  Work with your healthcare providers to decide what care will be used to treat you  You always have the right to refuse treatment  The above information is an  only  It is not intended as medical advice for individual conditions or treatments  Talk to your doctor, nurse or pharmacist before following any medical regimen to see if it is safe and effective for you  © 2017 [address] Information is for End User's use only and may not be sold, redistributed or otherwise used for commercial purposes  All illustrations and images included in CareNotes® are the copyrighted property of [employer]  or Roosevelt Bradley

## 2019-08-13 NOTE — ASSESSMENT & PLAN NOTE
Wt Readings from Last 3 Encounters:   08/13/19 81 4 kg (179 lb 6 4 oz)   08/08/19 77 3 kg (170 lb 6 4 oz)   06/07/19 76 2 kg (168 lb)     Patient has had a slow but steady increase in her weight especially since she stopped her diuretic  Patient has symptoms similar to those the border to the hospital recently for congestive heart failure  Again we will restart a low-dose diuretic  We will continue to monitor her kidney function

## 2019-08-13 NOTE — ASSESSMENT & PLAN NOTE
Patient has a history of hypertension with chronic kidney disease stage 3  Again patient was placed on a low-dose diuretic during her hospitalization and she was given no instructions once the prescription was finished as to whether to continue with medication  Because she is showing some increased swelling to the feet and ankles and evidence similar to what brought her to the emergency room originally which was congestive heart failure she will restart her Lasix at 10 mg daily along with potassium supplement  We will be checking on a basic metabolic profile in approximately 3 weeks prior to her next visit

## 2019-08-13 NOTE — ASSESSMENT & PLAN NOTE
Again patient has a history of atherosclerotic cardiovascular disease status post bypass surgery in the past   Patient during hospitalization had no evidence of acute ischemia  Patient does not have a follow-up visit with Cardiology until next year

## 2019-08-13 NOTE — ASSESSMENT & PLAN NOTE
Heart rate is controlled, rhythm is stable to today in the office  Patient continues on oral anticoagulants

## 2019-08-13 NOTE — ASSESSMENT & PLAN NOTE
Patient is here today for repeat Medicare wellness visit  Patient apparently was recently in the hospital for an episode of acute congestive heart failure  She did have full workup and evaluation, diuresed and was placed on a low dose of Lasix post discharge  Patient also had visiting nurses and help at home for short period of time but this has now stopped  Patient as mentioned was on a low dose of Lasix but ran out of the pill and is unsure whether she needs to continue the medication  She has not been contacted for an office visit with her cardiologist post discharge  She states that she is beginning to develop symptoms were similar to those that brought her to the hospital originally including some increasing cough and chest congestion  She also relates that she is having some increasing swelling to the feet and ankles which was noticed by the family  They did not contact her cardiologist regarding this  Otherwise with her hospitalization patient is up-to-date with all routine labs  Because of her age she is not a candidate for colon rectal screening, mammogram or gynecologic care    She is accompanied to the visit today by her niece

## 2019-08-28 LAB
BASOPHILS # BLD AUTO: 30 CELLS/UL (ref 0–200)
BASOPHILS NFR BLD AUTO: 0.9 %
BUN SERPL-MCNC: 15 MG/DL (ref 7–25)
BUN/CREAT SERPL: 11 (CALC) (ref 6–22)
CALCIUM SERPL-MCNC: 9 MG/DL (ref 8.6–10.4)
CHLORIDE SERPL-SCNC: 105 MMOL/L (ref 98–110)
CO2 SERPL-SCNC: 31 MMOL/L (ref 20–32)
CREAT SERPL-MCNC: 1.33 MG/DL (ref 0.6–0.88)
EOSINOPHIL # BLD AUTO: 172 CELLS/UL (ref 15–500)
EOSINOPHIL NFR BLD AUTO: 5.2 %
ERYTHROCYTE [DISTWIDTH] IN BLOOD BY AUTOMATED COUNT: 12.4 % (ref 11–15)
GLUCOSE SERPL-MCNC: 81 MG/DL (ref 65–99)
HCT VFR BLD AUTO: 36.1 % (ref 35–45)
HGB BLD-MCNC: 11.2 G/DL (ref 11.7–15.5)
LYMPHOCYTES # BLD AUTO: 1277 CELLS/UL (ref 850–3900)
LYMPHOCYTES NFR BLD AUTO: 38.7 %
MCH RBC QN AUTO: 30.2 PG (ref 27–33)
MCHC RBC AUTO-ENTMCNC: 31 G/DL (ref 32–36)
MCV RBC AUTO: 97.3 FL (ref 80–100)
MONOCYTES # BLD AUTO: 333 CELLS/UL (ref 200–950)
MONOCYTES NFR BLD AUTO: 10.1 %
NEUTROPHILS # BLD AUTO: 1488 CELLS/UL (ref 1500–7800)
NEUTROPHILS NFR BLD AUTO: 45.1 %
PLATELET # BLD AUTO: 197 THOUSAND/UL (ref 140–400)
PMV BLD REES-ECKER: 10.4 FL (ref 7.5–12.5)
POTASSIUM SERPL-SCNC: 3.9 MMOL/L (ref 3.5–5.3)
RBC # BLD AUTO: 3.71 MILLION/UL (ref 3.8–5.1)
SL AMB EGFR AFRICAN AMERICAN: 40 ML/MIN/1.73M2
SL AMB EGFR NON AFRICAN AMERICAN: 35 ML/MIN/1.73M2
SODIUM SERPL-SCNC: 142 MMOL/L (ref 135–146)
WBC # BLD AUTO: 3.3 THOUSAND/UL (ref 3.8–10.8)

## 2019-09-03 ENCOUNTER — OFFICE VISIT (OUTPATIENT)
Dept: INTERNAL MEDICINE CLINIC | Facility: CLINIC | Age: 84
End: 2019-09-03
Payer: COMMERCIAL

## 2019-09-03 VITALS
WEIGHT: 172 LBS | HEART RATE: 68 BPM | HEIGHT: 66 IN | SYSTOLIC BLOOD PRESSURE: 128 MMHG | DIASTOLIC BLOOD PRESSURE: 64 MMHG | TEMPERATURE: 97.3 F | BODY MASS INDEX: 27.64 KG/M2 | OXYGEN SATURATION: 93 %

## 2019-09-03 DIAGNOSIS — I48.0 PAROXYSMAL ATRIAL FIBRILLATION (HCC): Primary | ICD-10-CM

## 2019-09-03 DIAGNOSIS — R26.2 AMBULATORY DYSFUNCTION: ICD-10-CM

## 2019-09-03 DIAGNOSIS — N18.30 BENIGN HYPERTENSION WITH CHRONIC KIDNEY DISEASE, STAGE III (HCC): ICD-10-CM

## 2019-09-03 DIAGNOSIS — I50.41 ACUTE COMBINED SYSTOLIC AND DIASTOLIC CONGESTIVE HEART FAILURE (HCC): ICD-10-CM

## 2019-09-03 DIAGNOSIS — F41.9 ANXIETY: ICD-10-CM

## 2019-09-03 DIAGNOSIS — E78.00 PURE HYPERCHOLESTEROLEMIA: ICD-10-CM

## 2019-09-03 DIAGNOSIS — I12.9 BENIGN HYPERTENSION WITH CHRONIC KIDNEY DISEASE, STAGE III (HCC): ICD-10-CM

## 2019-09-03 DIAGNOSIS — I25.10 ARTERIOSCLEROTIC CARDIOVASCULAR DISEASE: ICD-10-CM

## 2019-09-03 PROCEDURE — 99214 OFFICE O/P EST MOD 30 MIN: CPT | Performed by: INTERNAL MEDICINE

## 2019-09-03 NOTE — ASSESSMENT & PLAN NOTE
Heart rate is controlled, rhythm seems to be a normal sinus today  Patient does have a follow-up appointment in the near future with Cardiology  She remains on oral anticoagulants    A recheck of a CBC shows her hemoglobin to be stable

## 2019-09-03 NOTE — ASSESSMENT & PLAN NOTE
With a history of hypertension, coronary artery disease we stressed the importance of diet with the patient making sure that she is watching her intake of fats and cholesterol with her diet  Patient does have a slip to check on a lipid profile with her next visit  She remains on atorvastatin 10 mg a day    We will adjust the dose if necessary

## 2019-09-03 NOTE — PROGRESS NOTES
Assessment/Plan:    Acute combined systolic and diastolic congestive heart failure (HCC)  Wt Readings from Last 3 Encounters:   09/03/19 78 kg (172 lb)   08/13/19 81 4 kg (179 lb 6 4 oz)   08/08/19 77 3 kg (170 lb 6 4 oz)         With the patient's last visit she states that she had run out of her diuretic and with that visit we did renew prescription for her medication  As noted patient has had a 7 lb weight loss since her last visit  She has had complete loss of the edema to lower extremities  She does have a upcoming appointment to be seen by her cardiologist in the near future  We did check on her renal function and it is stable    Benign hypertension with chronic kidney disease, stage III  We did check on the patient's renal function prior to her visit today  Her GFR has decreased only slightly  She is taking her diuretics as previously prescribed  We will check again on her renal function with her next visit    Paroxysmal atrial fibrillation (HCC)  Heart rate is controlled, rhythm seems to be a normal sinus today  Patient does have a follow-up appointment in the near future with Cardiology  She remains on oral anticoagulants  A recheck of a CBC shows her hemoglobin to be stable    Hyperlipidemia  With a history of hypertension, coronary artery disease we stressed the importance of diet with the patient making sure that she is watching her intake of fats and cholesterol with her diet  Patient does have a slip to check on a lipid profile with her next visit  She remains on atorvastatin 10 mg a day  We will adjust the dose if necessary    Anxiety  Patient does have a longstanding history of anxiety disorder  She remains on a low dose of citalopram 10 mg daily  Her family states that she has had no new issues or problems    Patient will continue present medication and we will make adjustments to medication or change the medication in the future if needed       Diagnoses and all orders for this visit:    Paroxysmal atrial fibrillation (HCC)  -     Comprehensive metabolic panel; Future  -     CBC and differential; Future  -     Lipid panel; Future  -     Urinalysis with reflex to microscopic; Future    Ambulatory dysfunction  -     Comprehensive metabolic panel; Future  -     CBC and differential; Future    Arteriosclerotic cardiovascular disease  -     Comprehensive metabolic panel; Future  -     CBC and differential; Future  -     Lipid panel; Future  -     Urinalysis with reflex to microscopic; Future    Acute combined systolic and diastolic congestive heart failure (HCC)    Benign hypertension with chronic kidney disease, stage III ([address] )    Pure hypercholesterolemia    Anxiety          Subjective:      Patient ID: Jennifer Zambrano is a 80 y o  female  78-year-old female with a history of multiple medical problems who is here today for re-evaluation  She is accompanied to the office today by her daughter  Patient states since she restarted her diuretic she has been doing well and has no new complaints or problems  She denies any chest pain or pressure and no increasing shortness of breath with exertion  She states her appetite is good and she is having no bowel or bladder difficulties  No new arthritic aches or pains or problems      The following portions of the patient's history were reviewed and updated as appropriate:   She  has no past medical history on file    She   Patient Active Problem List    Diagnosis Date Noted    Medicare annual wellness visit, subsequent 08/13/2019    Ambulatory dysfunction 06/07/2019    Acute combined systolic and diastolic congestive heart failure ([address] ) 06/07/2019    Anxiety 05/14/2019    Overweight (BMI 25 0-29 9) 04/03/2019    Rapid weight loss 06/28/2018    Paroxysmal atrial fibrillation ([address] ) 09/05/2017    Benign hypertension with chronic kidney disease, stage III ([address] ) 06/09/2017    Hyperlipidemia 06/24/2013    Arteriosclerotic cardiovascular disease 11/12/2012     She  has a past surgical history that includes Appendectomy; Reduction mammaplasty (Bilateral); Cardiac surgery; Replacement total knee; Neck surgery; and Cardiac pacemaker placement  Her family history includes Aneurysm in her brother; Other in her father  She  reports that she has never smoked  She has never used smokeless tobacco  She reports that she drinks alcohol  She reports that she does not use drugs  Current Outpatient Medications   Medication Sig Dispense Refill    amLODIPine (NORVASC) 5 mg tablet Take 1 tablet (5 mg total) by mouth daily 90 tablet 3    apixaban (ELIQUIS) 2 5 mg TAKE 1 TABLET BY MOUTH 2 TIMES A DAY      aspirin (ECOTRIN LOW STRENGTH) 81 mg EC tablet Take 81 mg by mouth daily      atenolol (TENORMIN) 50 mg tablet Take 1 tablet (50 mg total) by mouth daily 90 tablet 3    atorvastatin (LIPITOR) 10 mg tablet Take 1 tablet (10 mg total) by mouth daily 90 tablet 3    bimatoprost (LUMIGAN) 0 01 % ophthalmic drops 1 drop in both eyes at bedtime      cholecalciferol (VITAMIN D3) 1,000 units tablet Take 2,000 Units by mouth      citalopram (CeleXA) 10 mg tablet Take 1 tablet (10 mg total) by mouth daily 30 tablet 5    cyanocobalamin 1000 MCG tablet Take 1,000 mcg by mouth      dorzolamide-timolol (COSOPT) 22 3-6 8 MG/ML ophthalmic solution Apply to eye      ELIQUIS 2 5 MG   1    ferrous sulfate (RA IRON) 325 (65 Fe) mg tablet Take 1 tablet (325 mg total) by mouth daily 30 tablet 5    furosemide (LASIX) 20 mg tablet Take 1 tablet (20 mg total) by mouth daily 30 tablet 2    guaiFENesin (MUCINEX) 600 mg 12 hr tablet Take 600 mg by mouth 2 (two) times a day as needed      LUMIGAN 0 01 % ophthalmic drops   0    Misc  Devices (TRANSPORT CHAIR) MISC by Does not apply route as needed (Lower extremity weakness) Patient does have a history of degenerative arthritis and she does have problems with lower extremity weakness, via ambulatory dysfunction    Patient also has a history of coronary artery disease and is not able 1 each 0    omeprazole (PriLOSEC) 20 mg delayed release capsule Take 20 mg by mouth daily      potassium chloride (K-DUR,KLOR-CON) 20 mEq tablet Take 1 tablet (20 mEq total) by mouth daily 30 tablet 3     No current facility-administered medications for this visit  Current Outpatient Medications on File Prior to Visit   Medication Sig    amLODIPine (NORVASC) 5 mg tablet Take 1 tablet (5 mg total) by mouth daily    apixaban (ELIQUIS) 2 5 mg TAKE 1 TABLET BY MOUTH 2 TIMES A DAY    aspirin (ECOTRIN LOW STRENGTH) 81 mg EC tablet Take 81 mg by mouth daily    atenolol (TENORMIN) 50 mg tablet Take 1 tablet (50 mg total) by mouth daily    atorvastatin (LIPITOR) 10 mg tablet Take 1 tablet (10 mg total) by mouth daily    bimatoprost (LUMIGAN) 0 01 % ophthalmic drops 1 drop in both eyes at bedtime    cholecalciferol (VITAMIN D3) 1,000 units tablet Take 2,000 Units by mouth    citalopram (CeleXA) 10 mg tablet Take 1 tablet (10 mg total) by mouth daily    cyanocobalamin 1000 MCG tablet Take 1,000 mcg by mouth    dorzolamide-timolol (COSOPT) 22 3-6 8 MG/ML ophthalmic solution Apply to eye    ELIQUIS 2 5 MG     ferrous sulfate (RA IRON) 325 (65 Fe) mg tablet Take 1 tablet (325 mg total) by mouth daily    furosemide (LASIX) 20 mg tablet Take 1 tablet (20 mg total) by mouth daily    guaiFENesin (MUCINEX) 600 mg 12 hr tablet Take 600 mg by mouth 2 (two) times a day as needed    LUMIGAN 0 01 % ophthalmic drops     Misc  Devices (TRANSPORT CHAIR) MISC by Does not apply route as needed (Lower extremity weakness) Patient does have a history of degenerative arthritis and she does have problems with lower extremity weakness, via ambulatory dysfunction    Patient also has a history of coronary artery disease and is not able    omeprazole (PriLOSEC) 20 mg delayed release capsule Take 20 mg by mouth daily    potassium chloride (K-DUR,KLOR-CON) 20 mEq tablet Take 1 tablet (20 mEq total) by mouth daily     No current facility-administered medications on file prior to visit  She has No Known Allergies       Review of Systems   Constitutional: Negative  HENT: Negative  Eyes: Negative  Respiratory: Negative  Cardiovascular: Positive for leg swelling (Complete resolution of lower extremity edema from her last visit)  Negative for chest pain and palpitations  Gastrointestinal: Negative  Endocrine: Negative  Genitourinary: Negative  Musculoskeletal: Positive for arthralgias (chronic degenerative arthritis without change), back pain and gait problem  Negative for joint swelling, myalgias, neck pain and neck stiffness  Skin: Negative  Allergic/Immunologic: Negative  Neurological: Negative for dizziness, tremors, seizures, syncope, facial asymmetry, speech difficulty, weakness, light-headedness, numbness and headaches  Hematological: Negative  Psychiatric/Behavioral: Negative  Objective:      /64   Pulse 68   Temp (!) 97 3 °F (36 3 °C)   Ht 5' 6" (1 676 m)   Wt 78 kg (172 lb)   SpO2 93%   BMI 27 76 kg/m²          Physical Exam   Constitutional: She is oriented to person, place, and time  She appears well-developed and well-nourished  No distress  Extremely pleasant, soft-spoken 60-year-old female who is awake alert in no acute distress and oriented x3   HENT:   Head: Normocephalic and atraumatic  Right Ear: External ear normal    Left Ear: External ear normal    Nose: Nose normal    Mouth/Throat: No oropharyngeal exudate  Pink slightly dry oral mucous membranes   Eyes: Pupils are equal, round, and reactive to light  Conjunctivae and EOM are normal  Right eye exhibits no discharge  Left eye exhibits no discharge  No scleral icterus  Neck: Normal range of motion  Neck supple  No JVD present  No tracheal deviation present  No thyromegaly present  Cardiovascular: Normal rate, regular rhythm and intact distal pulses   Exam reveals no gallop and no friction rub  Murmur heard  Pulmonary/Chest: Effort normal and breath sounds normal  No stridor  No respiratory distress  She has no wheezes  She has no rales  She exhibits no tenderness  Abdominal: Soft  Bowel sounds are normal  She exhibits no distension and no mass  There is no tenderness  There is no rebound and no guarding  No hernia  Obese   Musculoskeletal: She exhibits deformity  She exhibits no edema or tenderness  Diffuse degenerative changes as noted previously  No new findings on examination, no edema to lower extremities today   Lymphadenopathy:     She has no cervical adenopathy  Neurological: She is alert and oriented to person, place, and time  She displays abnormal reflex (Absent patella and Achilles tendon reflexes bilaterally)  No cranial nerve deficit or sensory deficit  She exhibits normal muscle tone  Coordination normal    Skin: Skin is dry  No rash noted  She is not diaphoretic  No erythema  No pallor  Psychiatric: She has a normal mood and affect  Her behavior is normal  Judgment and thought content normal    Nursing note and vitals reviewed

## 2019-09-03 NOTE — ASSESSMENT & PLAN NOTE
We did check on the patient's renal function prior to her visit today  Her GFR has decreased only slightly  She is taking her diuretics as previously prescribed    We will check again on her renal function with her next visit

## 2019-09-03 NOTE — ASSESSMENT & PLAN NOTE
Patient does have a longstanding history of anxiety disorder  She remains on a low dose of citalopram 10 mg daily  Her family states that she has had no new issues or problems    Patient will continue present medication and we will make adjustments to medication or change the medication in the future if needed

## 2019-09-03 NOTE — ASSESSMENT & PLAN NOTE
Wt Readings from Last 3 Encounters:   09/03/19 78 kg (172 lb)   08/13/19 81 4 kg (179 lb 6 4 oz)   08/08/19 77 3 kg (170 lb 6 4 oz)         With the patient's last visit she states that she had run out of her diuretic and with that visit we did renew prescription for her medication  As noted patient has had a 7 lb weight loss since her last visit  She has had complete loss of the edema to lower extremities  She does have a upcoming appointment to be seen by her cardiologist in the near future    We did check on her renal function and it is stable

## 2019-10-01 DIAGNOSIS — D50.8 IRON DEFICIENCY ANEMIA SECONDARY TO INADEQUATE DIETARY IRON INTAKE: ICD-10-CM

## 2019-10-01 RX ORDER — FERROUS SULFATE 325(65) MG
1 TABLET ORAL DAILY
Qty: 30 TABLET | Refills: 5 | Status: SHIPPED | OUTPATIENT
Start: 2019-10-01 | End: 2020-03-16

## 2019-10-14 ENCOUNTER — TELEPHONE (OUTPATIENT)
Dept: INTERNAL MEDICINE CLINIC | Facility: CLINIC | Age: 84
End: 2019-10-14

## 2019-10-14 DIAGNOSIS — J40 BRONCHITIS: Primary | ICD-10-CM

## 2019-10-14 RX ORDER — AZITHROMYCIN 250 MG/1
TABLET, FILM COATED ORAL
Qty: 6 TABLET | Refills: 0 | Status: SHIPPED | OUTPATIENT
Start: 2019-10-14 | End: 2019-10-19

## 2019-10-14 NOTE — TELEPHONE ENCOUNTER
Patient's daughter called back to check on status of message   Asking if she can get a call back soon concerned if she should take patient to urgent care or if you could prescribe something

## 2019-10-14 NOTE — TELEPHONE ENCOUNTER
Patients daughter called stating the patient has had a productive cough for over two weeks  She would like to know if there is anything you can prescribe the patient or would she have to be seen

## 2019-11-19 ENCOUNTER — OFFICE VISIT (OUTPATIENT)
Dept: PODIATRY | Facility: CLINIC | Age: 84
End: 2019-11-19
Payer: COMMERCIAL

## 2019-11-19 VITALS
BODY MASS INDEX: 27.42 KG/M2 | HEIGHT: 66 IN | HEART RATE: 76 BPM | DIASTOLIC BLOOD PRESSURE: 74 MMHG | WEIGHT: 170.6 LBS | SYSTOLIC BLOOD PRESSURE: 122 MMHG

## 2019-11-19 DIAGNOSIS — L84 CORNS: ICD-10-CM

## 2019-11-19 DIAGNOSIS — I73.9 PERIPHERAL VASCULAR DISEASE, UNSPECIFIED (HCC): Primary | ICD-10-CM

## 2019-11-19 PROCEDURE — 11056 PARNG/CUTG B9 HYPRKR LES 2-4: CPT | Performed by: PODIATRIST

## 2019-11-19 PROCEDURE — 11719 TRIM NAIL(S) ANY NUMBER: CPT | Performed by: PODIATRIST

## 2019-11-19 NOTE — PROGRESS NOTES
Established patient with class findings presents for nail and lesion care  Vascular exam:  DP  0/4 bilateral; PT  0 4 bilateral   Dermatological exam:  Each toenail is thick and  Dystrophic  Hyperkeratotic lesions beneath 1st metatarsal head bilateral and plantar aspect right heel  Diagnosis:  PVD; hyperkeratotic lesions bilateral  Treatment: Trimmed multiple dystrophic toenails  Trimmed hyperkeratotic lesions bilateral    Nail removal  Date/Time: 11/19/2019 3:25 PM  Performed by: Khushboo Luong DPM  Authorized by: Khushboo Luong DPM     Patient location:  Clinic  Anesthesia (see MAR for exact dosages): Anesthesia method:  None  Nails trimmed:     Number of nails trimmed:  10  Post-procedure details:     Patient tolerance of procedure:   Tolerated well, no immediate complications  Lesion Destruction  Date/Time: 11/19/2019 3:25 PM  Performed by: Khushboo Luong DPM  Authorized by: Khushboo Luong DPM     Procedure Details - Lesion Destruction:     Number of Lesions:  3  Lesion 1:     Body area:  Lower extremity    Lower extremity location:  R foot    Malignancy: benign hyperkeratotic lesion      Destruction method: scissors used for extraction    Lesion 2:     Body area:  Lower extremity    Lower extremity location:  R foot    Malignancy: benign hyperkeratotic lesion      Destruction method: scissors used for extraction    Lesion 3:     Body area:  Lower extremity    Lower extremity location:  L foot    Malignancy: benign hyperkeratotic lesion      Destruction method: scissors used for extraction

## 2019-11-24 DIAGNOSIS — F41.9 ANXIETY: ICD-10-CM

## 2019-11-25 RX ORDER — CITALOPRAM 10 MG/1
TABLET ORAL
Qty: 30 TABLET | Refills: 5 | Status: SHIPPED | OUTPATIENT
Start: 2019-11-25 | End: 2020-04-17

## 2019-12-03 DIAGNOSIS — I50.41 ACUTE COMBINED SYSTOLIC AND DIASTOLIC CONGESTIVE HEART FAILURE (HCC): ICD-10-CM

## 2019-12-03 DIAGNOSIS — I25.10 ARTERIOSCLEROTIC CARDIOVASCULAR DISEASE: ICD-10-CM

## 2019-12-03 RX ORDER — FUROSEMIDE 20 MG/1
20 TABLET ORAL DAILY
Qty: 90 TABLET | Refills: 2 | Status: SHIPPED | OUTPATIENT
Start: 2019-12-03 | End: 2020-08-21 | Stop reason: SDUPTHER

## 2019-12-17 LAB
ALBUMIN SERPL-MCNC: 3.7 G/DL (ref 3.6–5.1)
ALBUMIN/GLOB SERPL: 1.4 (CALC) (ref 1–2.5)
ALP SERPL-CCNC: 89 U/L (ref 33–130)
ALT SERPL-CCNC: 16 U/L (ref 6–29)
APPEARANCE UR: CLEAR
AST SERPL-CCNC: 25 U/L (ref 10–35)
BACTERIA UR QL AUTO: ABNORMAL /HPF
BASOPHILS # BLD AUTO: 30 CELLS/UL (ref 0–200)
BASOPHILS NFR BLD AUTO: 0.9 %
BILIRUB SERPL-MCNC: 0.5 MG/DL (ref 0.2–1.2)
BILIRUB UR QL STRIP: NEGATIVE
BUN SERPL-MCNC: 18 MG/DL (ref 7–25)
BUN/CREAT SERPL: 16 (CALC) (ref 6–22)
CALCIUM SERPL-MCNC: 9.2 MG/DL (ref 8.6–10.4)
CHLORIDE SERPL-SCNC: 106 MMOL/L (ref 98–110)
CHOLEST SERPL-MCNC: 167 MG/DL
CHOLEST/HDLC SERPL: 3.9 (CALC)
CO2 SERPL-SCNC: 29 MMOL/L (ref 20–32)
COLOR UR: YELLOW
CREAT SERPL-MCNC: 1.13 MG/DL (ref 0.6–0.88)
EOSINOPHIL # BLD AUTO: 149 CELLS/UL (ref 15–500)
EOSINOPHIL NFR BLD AUTO: 4.5 %
ERYTHROCYTE [DISTWIDTH] IN BLOOD BY AUTOMATED COUNT: 12.7 % (ref 11–15)
GLOBULIN SER CALC-MCNC: 2.7 G/DL (CALC) (ref 1.9–3.7)
GLUCOSE SERPL-MCNC: 84 MG/DL (ref 65–99)
GLUCOSE UR QL STRIP: NEGATIVE
HCT VFR BLD AUTO: 31.2 % (ref 35–45)
HDLC SERPL-MCNC: 43 MG/DL
HGB BLD-MCNC: 9.6 G/DL (ref 11.7–15.5)
HGB UR QL STRIP: NEGATIVE
HYALINE CASTS #/AREA URNS LPF: ABNORMAL /LPF
KETONES UR QL STRIP: NEGATIVE
LDLC SERPL CALC-MCNC: 105 MG/DL (CALC)
LEUKOCYTE ESTERASE UR QL STRIP: ABNORMAL
LYMPHOCYTES # BLD AUTO: 789 CELLS/UL (ref 850–3900)
LYMPHOCYTES NFR BLD AUTO: 23.9 %
MCH RBC QN AUTO: 28.9 PG (ref 27–33)
MCHC RBC AUTO-ENTMCNC: 30.8 G/DL (ref 32–36)
MCV RBC AUTO: 94 FL (ref 80–100)
MONOCYTES # BLD AUTO: 538 CELLS/UL (ref 200–950)
MONOCYTES NFR BLD AUTO: 16.3 %
NEUTROPHILS # BLD AUTO: 1795 CELLS/UL (ref 1500–7800)
NEUTROPHILS NFR BLD AUTO: 54.4 %
NITRITE UR QL STRIP: NEGATIVE
NONHDLC SERPL-MCNC: 124 MG/DL (CALC)
PH UR STRIP: 6 [PH] (ref 5–8)
PLATELET # BLD AUTO: 208 THOUSAND/UL (ref 140–400)
PMV BLD REES-ECKER: 11.2 FL (ref 7.5–12.5)
POTASSIUM SERPL-SCNC: 3.9 MMOL/L (ref 3.5–5.3)
PROT SERPL-MCNC: 6.4 G/DL (ref 6.1–8.1)
PROT UR QL STRIP: NEGATIVE
RBC # BLD AUTO: 3.32 MILLION/UL (ref 3.8–5.1)
RBC #/AREA URNS HPF: ABNORMAL /HPF
SL AMB EGFR AFRICAN AMERICAN: 49 ML/MIN/1.73M2
SL AMB EGFR NON AFRICAN AMERICAN: 42 ML/MIN/1.73M2
SODIUM SERPL-SCNC: 140 MMOL/L (ref 135–146)
SP GR UR STRIP: 1.02 (ref 1–1.03)
SQUAMOUS #/AREA URNS HPF: ABNORMAL /HPF
TRIGL SERPL-MCNC: 93 MG/DL
WBC # BLD AUTO: 3.3 THOUSAND/UL (ref 3.8–10.8)
WBC #/AREA URNS HPF: ABNORMAL /HPF

## 2019-12-28 DIAGNOSIS — I10 ESSENTIAL HYPERTENSION: ICD-10-CM

## 2019-12-30 RX ORDER — AMLODIPINE BESYLATE 5 MG/1
TABLET ORAL
Qty: 90 TABLET | Refills: 0 | Status: SHIPPED | OUTPATIENT
Start: 2019-12-30 | End: 2020-03-26

## 2020-01-03 ENCOUNTER — OFFICE VISIT (OUTPATIENT)
Dept: INTERNAL MEDICINE CLINIC | Facility: CLINIC | Age: 85
End: 2020-01-03
Payer: COMMERCIAL

## 2020-01-03 VITALS
DIASTOLIC BLOOD PRESSURE: 66 MMHG | WEIGHT: 174 LBS | SYSTOLIC BLOOD PRESSURE: 118 MMHG | HEIGHT: 66 IN | OXYGEN SATURATION: 96 % | TEMPERATURE: 96.8 F | HEART RATE: 70 BPM | BODY MASS INDEX: 27.97 KG/M2

## 2020-01-03 DIAGNOSIS — I25.10 ARTERIOSCLEROTIC CARDIOVASCULAR DISEASE: ICD-10-CM

## 2020-01-03 DIAGNOSIS — I48.0 PAROXYSMAL ATRIAL FIBRILLATION (HCC): ICD-10-CM

## 2020-01-03 DIAGNOSIS — E78.00 PURE HYPERCHOLESTEROLEMIA: ICD-10-CM

## 2020-01-03 DIAGNOSIS — I12.9 BENIGN HYPERTENSION WITH CHRONIC KIDNEY DISEASE, STAGE III (HCC): ICD-10-CM

## 2020-01-03 DIAGNOSIS — N18.30 BENIGN HYPERTENSION WITH CHRONIC KIDNEY DISEASE, STAGE III (HCC): ICD-10-CM

## 2020-01-03 DIAGNOSIS — N18.30 ANEMIA DUE TO STAGE 3 CHRONIC KIDNEY DISEASE (HCC): Primary | ICD-10-CM

## 2020-01-03 DIAGNOSIS — D63.1 ANEMIA DUE TO STAGE 3 CHRONIC KIDNEY DISEASE (HCC): Primary | ICD-10-CM

## 2020-01-03 PROBLEM — N18.9 ANEMIA DUE TO CHRONIC KIDNEY DISEASE: Status: ACTIVE | Noted: 2019-12-19

## 2020-01-03 PROCEDURE — 1036F TOBACCO NON-USER: CPT | Performed by: INTERNAL MEDICINE

## 2020-01-03 PROCEDURE — 1160F RVW MEDS BY RX/DR IN RCRD: CPT | Performed by: INTERNAL MEDICINE

## 2020-01-03 PROCEDURE — 99214 OFFICE O/P EST MOD 30 MIN: CPT | Performed by: INTERNAL MEDICINE

## 2020-01-03 NOTE — ASSESSMENT & PLAN NOTE
Patient does have a longstanding history of atrial fibrillation  She remains on oral anticoagulants  We did have some anxiety over the fact the patient is still anemic  She has had full GI workup and evaluation showing no evidence of any internal bleeding  She will continue on present medication specifically Eliquis and was told that if she does have any abnormal bleeding whatsoever, hematuria, black stools or blood in the stools, hematemesis, epistaxis to please call immediately or proceed to the emergency room

## 2020-01-03 NOTE — ASSESSMENT & PLAN NOTE
Patient did have a recent visit with her cardiologist   She states that they have made no changes with her medication but are suggesting that she does have an iron infusion because of her chronic anemia  She does have an upcoming appointment to be seen by hematologist for evaluation  Patient did have full GI workup and evaluation  Recent CBC shows a blood count, hemoglobin down to 9 6 from 10 2  Again no abnormal bleeding    Patient denies any chest pain or pressure and no increasing shortness of breath

## 2020-01-03 NOTE — PROGRESS NOTES
Assessment/Plan:    Paroxysmal atrial fibrillation [facility])  Patient does have a longstanding history of atrial fibrillation  She remains on oral anticoagulants  We did have some anxiety over the fact the patient is still anemic  She has had full GI workup and evaluation showing no evidence of any internal bleeding  She will continue on present medication specifically Eliquis and was told that if she does have any abnormal bleeding whatsoever, hematuria, black stools or blood in the stools, hematemesis, epistaxis to please call immediately or proceed to the emergency room  Hyperlipidemia  Patient does have a history of hyperlipidemia, history of coronary artery disease bypass surgery in the past   Patient remains of atorvastatin 10 mg daily  She states that during the holiday season she was not as careful with her diet  We did reinforce the importance of watching her diet, limiting her intake of fats and cholesterol with her diet  Patient understands and agrees    Benign hypertension with chronic kidney disease, stage III  Patient does have a longstanding history of hypertension  As noted her blood pressure showing excellent control  We will also continue to monitor her renal function which is been stable  Patient was told we will check a basic metabolic profile with her next visit again to make sure that her renal function is stable  Arteriosclerotic cardiovascular disease  Patient did have a recent visit with her cardiologist   She states that they have made no changes with her medication but are suggesting that she does have an iron infusion because of her chronic anemia  She does have an upcoming appointment to be seen by hematologist for evaluation  Patient did have full GI workup and evaluation  Recent CBC shows a blood count, hemoglobin down to 9 6 from 10 2  Again no abnormal bleeding    Patient denies any chest pain or pressure and no increasing shortness of breath       Diagnoses and all orders for this visit:    Anemia due to stage 3 chronic kidney disease (HCC)  -     CBC and differential; Future  -     Basic metabolic panel; Future    Paroxysmal atrial fibrillation (HCC)    Pure hypercholesterolemia    Benign hypertension with chronic kidney disease, stage III (HCC)    Arteriosclerotic cardiovascular disease          Subjective:      Patient ID: Astrid Gregg is a 80 y o  female  Patient is a 61-year-old female with a history of multiple medical problems as outlined previously  Patient is here today for a routine follow-up  Was recently seen by her cardiologist, had recent lab tests performed with their office  Patient is showing some progression of her anemia  Patient has had full workup and evaluation for any type of GI disorder which was negative  Has an appointment to see an hematologist in the near future for evaluation  Discussed possible iron infusions  Patient states in general he is feeling well and offers no new complaints or concerns  The following portions of the patient's history were reviewed and updated as appropriate: She  has a past medical history of Heart disease, High cholesterol, and Hypertension  She   Patient Active Problem List    Diagnosis Date Noted    Anemia due to chronic kidney disease 12/19/2019    Bronchitis 10/14/2019    Medicare annual wellness visit, subsequent 08/13/2019    Ambulatory dysfunction 06/07/2019    Acute combined systolic and diastolic congestive heart failure ([facility] [address] ) 06/07/2019    Anxiety 05/14/2019    Overweight (BMI 25 0-29 9) 04/03/2019    Rapid weight loss 06/28/2018    Paroxysmal atrial fibrillation ([facility] ) 09/05/2017    Benign hypertension with chronic kidney disease, stage III ([facility] [facility] ) 06/09/2017    Hyperlipidemia 06/24/2013    Arteriosclerotic cardiovascular disease 11/12/2012     She  has a past surgical history that includes Appendectomy; Reduction mammaplasty (Bilateral);  Cardiac surgery; Replacement total knee; Neck surgery; and Cardiac pacemaker placement  Her family history includes Aneurysm in her brother; Other in her father  She  reports that she has never smoked  She has never used smokeless tobacco  She reports that she drank alcohol  She reports that she does not use drugs  Current Outpatient Medications   Medication Sig Dispense Refill    amLODIPine (NORVASC) 5 mg tablet Take 1 tablet (5 mg total) by mouth daily 90 tablet 3    amLODIPine (NORVASC) 5 mg tablet take 1 tablet by mouth once daily 90 tablet 0    apixaban (ELIQUIS) 2 5 mg TAKE 1 TABLET BY MOUTH 2 TIMES A DAY      aspirin (ECOTRIN LOW STRENGTH) 81 mg EC tablet Take 81 mg by mouth daily      atenolol (TENORMIN) 50 mg tablet Take 1 tablet (50 mg total) by mouth daily 90 tablet 3    atorvastatin (LIPITOR) 10 mg tablet Take 1 tablet (10 mg total) by mouth daily 90 tablet 3    bimatoprost (LUMIGAN) 0 01 % ophthalmic drops 1 drop in both eyes at bedtime      cholecalciferol (VITAMIN D3) 1,000 units tablet Take 2,000 Units by mouth      citalopram (CeleXA) 10 mg tablet take 1 tablet by mouth once daily 30 tablet 5    cyanocobalamin 1000 MCG tablet Take 1,000 mcg by mouth      dorzolamide-timolol (COSOPT) 22 3-6 8 MG/ML ophthalmic solution Apply to eye      ELIQUIS 2 5 MG   1    ferrous sulfate (RA IRON) 325 (65 Fe) mg tablet Take 1 tablet (325 mg total) by mouth daily 30 tablet 5    furosemide (LASIX) 20 mg tablet Take 1 tablet (20 mg total) by mouth daily 90 tablet 2    guaiFENesin (MUCINEX) 600 mg 12 hr tablet Take 600 mg by mouth 2 (two) times a day as needed      LUMIGAN 0 01 % ophthalmic drops   0    Misc  Devices (TRANSPORT CHAIR) MISC by Does not apply route as needed (Lower extremity weakness) Patient does have a history of degenerative arthritis and she does have problems with lower extremity weakness, via ambulatory dysfunction    Patient also has a history of coronary artery disease and is not able 1 each 0    omeprazole (PriLOSEC) 20 mg delayed release capsule Take 20 mg by mouth daily      potassium chloride (K-DUR,KLOR-CON) 20 mEq tablet Take 1 tablet (20 mEq total) by mouth daily 30 tablet 3     No current facility-administered medications for this visit  Current Outpatient Medications on File Prior to Visit   Medication Sig    amLODIPine (NORVASC) 5 mg tablet Take 1 tablet (5 mg total) by mouth daily    amLODIPine (NORVASC) 5 mg tablet take 1 tablet by mouth once daily    apixaban (ELIQUIS) 2 5 mg TAKE 1 TABLET BY MOUTH 2 TIMES A DAY    aspirin (ECOTRIN LOW STRENGTH) 81 mg EC tablet Take 81 mg by mouth daily    atenolol (TENORMIN) 50 mg tablet Take 1 tablet (50 mg total) by mouth daily    atorvastatin (LIPITOR) 10 mg tablet Take 1 tablet (10 mg total) by mouth daily    bimatoprost (LUMIGAN) 0 01 % ophthalmic drops 1 drop in both eyes at bedtime    cholecalciferol (VITAMIN D3) 1,000 units tablet Take 2,000 Units by mouth    citalopram (CeleXA) 10 mg tablet take 1 tablet by mouth once daily    cyanocobalamin 1000 MCG tablet Take 1,000 mcg by mouth    dorzolamide-timolol (COSOPT) 22 3-6 8 MG/ML ophthalmic solution Apply to eye    ELIQUIS 2 5 MG     ferrous sulfate (RA IRON) 325 (65 Fe) mg tablet Take 1 tablet (325 mg total) by mouth daily    furosemide (LASIX) 20 mg tablet Take 1 tablet (20 mg total) by mouth daily    guaiFENesin (MUCINEX) 600 mg 12 hr tablet Take 600 mg by mouth 2 (two) times a day as needed    LUMIGAN 0 01 % ophthalmic drops     Misc  Devices (TRANSPORT CHAIR) MISC by Does not apply route as needed (Lower extremity weakness) Patient does have a history of degenerative arthritis and she does have problems with lower extremity weakness, via ambulatory dysfunction    Patient also has a history of coronary artery disease and is not able    omeprazole (PriLOSEC) 20 mg delayed release capsule Take 20 mg by mouth daily    potassium chloride (K-DUR,KLOR-CON) 20 mEq tablet Take 1 tablet (20 mEq total) by mouth daily     No current facility-administered medications on file prior to visit  She has No Known Allergies       Review of Systems   Constitutional: Negative  HENT: Negative  Eyes: Negative  Respiratory: Positive for shortness of breath (Patient does have some shortness of breath with brisk exertion but no increase)  Negative for apnea, cough, choking, chest tightness, wheezing and stridor  Cardiovascular: Negative  Gastrointestinal: Negative  Endocrine: Negative  Genitourinary: Negative  Musculoskeletal: Positive for arthralgias, back pain and gait problem  Negative for joint swelling, myalgias, neck pain and neck stiffness  Skin: Negative  Allergic/Immunologic: Negative  Neurological: Negative for dizziness, tremors, seizures, syncope, facial asymmetry, speech difficulty, weakness, light-headedness, numbness and headaches  Hematological: Negative  Psychiatric/Behavioral: Negative  Objective:      /66   Pulse 70   Temp (!) 96 8 °F (36 °C)   Ht 5' 6" (1 676 m)   Wt 78 9 kg (174 lb)   SpO2 96%   BMI 28 08 kg/m²          Physical Exam   Constitutional: She is oriented to person, place, and time  She appears well-developed and well-nourished  No distress  Extremely pleasant, cheerful 44-year-old female who is awake alert no acute distress and oriented x3  Patient is accompanied today to the visit by her daughter   Cecily Edge:   Head: Normocephalic and atraumatic  Right Ear: External ear normal    Left Ear: External ear normal    Nose: Nose normal    Mouth/Throat: Oropharynx is clear and moist  No oropharyngeal exudate  Eyes: Pupils are equal, round, and reactive to light  Conjunctivae and EOM are normal  Right eye exhibits no discharge  Left eye exhibits no discharge  No scleral icterus  Neck: Normal range of motion  Neck supple  No JVD present  No tracheal deviation present  No thyromegaly present     Cardiovascular: Normal rate, regular rhythm and intact distal pulses  Exam reveals no gallop and no friction rub  Murmur heard  Pulmonary/Chest: Effort normal and breath sounds normal  No stridor  No respiratory distress  She has no wheezes  She has no rales  She exhibits no tenderness  Abdominal: Soft  Bowel sounds are normal  She exhibits no distension and no mass  There is no tenderness  There is no rebound and no guarding  No hernia  Mildly overweight   Musculoskeletal: Normal range of motion  She exhibits deformity  She exhibits no edema or tenderness  Lymphadenopathy:     She has no cervical adenopathy  Neurological: She is alert and oriented to person, place, and time  She displays normal reflexes  No cranial nerve deficit or sensory deficit  She exhibits normal muscle tone  Coordination normal    Skin: Skin is warm and dry  Capillary refill takes less than 2 seconds  No rash noted  She is not diaphoretic  No erythema  No pallor  Psychiatric: She has a normal mood and affect  Her behavior is normal  Judgment and thought content normal    Nursing note and vitals reviewed

## 2020-01-03 NOTE — ASSESSMENT & PLAN NOTE
Patient does have a longstanding history of hypertension  As noted her blood pressure showing excellent control  We will also continue to monitor her renal function which is been stable  Patient was told we will check a basic metabolic profile with her next visit again to make sure that her renal function is stable

## 2020-01-03 NOTE — ASSESSMENT & PLAN NOTE
Patient does have a history of hyperlipidemia, history of coronary artery disease bypass surgery in the past   Patient remains of atorvastatin 10 mg daily  She states that during the holiday season she was not as careful with her diet  We did reinforce the importance of watching her diet, limiting her intake of fats and cholesterol with her diet    Patient understands and agrees

## 2020-02-06 DIAGNOSIS — H40.1130 PRIMARY OPEN ANGLE GLAUCOMA OF BOTH EYES, UNSPECIFIED GLAUCOMA STAGE: Primary | ICD-10-CM

## 2020-02-06 RX ORDER — DORZOLAMIDE HYDROCHLORIDE AND TIMOLOL MALEATE 20; 5 MG/ML; MG/ML
1 SOLUTION/ DROPS OPHTHALMIC 2 TIMES DAILY
Qty: 10 ML | Refills: 0 | Status: SHIPPED | OUTPATIENT
Start: 2020-02-06 | End: 2020-03-23

## 2020-03-16 DIAGNOSIS — D50.8 IRON DEFICIENCY ANEMIA SECONDARY TO INADEQUATE DIETARY IRON INTAKE: ICD-10-CM

## 2020-03-16 RX ORDER — FERROUS SULFATE 325(65) MG
TABLET ORAL
Qty: 30 TABLET | Refills: 5 | Status: SHIPPED | OUTPATIENT
Start: 2020-03-16 | End: 2020-03-20

## 2020-03-19 DIAGNOSIS — D50.8 IRON DEFICIENCY ANEMIA SECONDARY TO INADEQUATE DIETARY IRON INTAKE: ICD-10-CM

## 2020-03-20 RX ORDER — FERROUS SULFATE 325(65) MG
TABLET ORAL
Qty: 30 TABLET | Refills: 5 | Status: SHIPPED | OUTPATIENT
Start: 2020-03-20 | End: 2020-04-14

## 2020-03-21 DIAGNOSIS — H40.1130 PRIMARY OPEN ANGLE GLAUCOMA OF BOTH EYES, UNSPECIFIED GLAUCOMA STAGE: ICD-10-CM

## 2020-03-23 RX ORDER — DORZOLAMIDE HYDROCHLORIDE AND TIMOLOL MALEATE 20; 5 MG/ML; MG/ML
SOLUTION/ DROPS OPHTHALMIC
Qty: 10 ML | Refills: 0 | Status: SHIPPED | OUTPATIENT
Start: 2020-03-23 | End: 2020-05-08

## 2020-03-26 DIAGNOSIS — I10 ESSENTIAL HYPERTENSION: ICD-10-CM

## 2020-03-26 RX ORDER — AMLODIPINE BESYLATE 5 MG/1
TABLET ORAL
Qty: 90 TABLET | Refills: 0 | Status: SHIPPED | OUTPATIENT
Start: 2020-03-26 | End: 2020-06-29

## 2020-04-12 DIAGNOSIS — I10 ESSENTIAL HYPERTENSION: ICD-10-CM

## 2020-04-12 DIAGNOSIS — E78.5 HYPERLIPIDEMIA, UNSPECIFIED HYPERLIPIDEMIA TYPE: ICD-10-CM

## 2020-04-12 RX ORDER — ATENOLOL 50 MG/1
TABLET ORAL
Qty: 90 TABLET | Refills: 3 | Status: SHIPPED | OUTPATIENT
Start: 2020-04-12 | End: 2021-03-24 | Stop reason: SDUPTHER

## 2020-04-12 RX ORDER — ATORVASTATIN CALCIUM 10 MG/1
TABLET, FILM COATED ORAL
Qty: 90 TABLET | Refills: 3 | Status: SHIPPED | OUTPATIENT
Start: 2020-04-12 | End: 2021-04-02 | Stop reason: SDUPTHER

## 2020-04-13 DIAGNOSIS — D50.8 IRON DEFICIENCY ANEMIA SECONDARY TO INADEQUATE DIETARY IRON INTAKE: ICD-10-CM

## 2020-04-14 RX ORDER — FERROUS SULFATE 325(65) MG
TABLET ORAL
Qty: 30 TABLET | Refills: 5 | Status: SHIPPED | OUTPATIENT
Start: 2020-04-14 | End: 2020-12-08 | Stop reason: SDUPTHER

## 2020-04-17 DIAGNOSIS — F41.9 ANXIETY: ICD-10-CM

## 2020-04-17 RX ORDER — CITALOPRAM 10 MG/1
TABLET ORAL
Qty: 30 TABLET | Refills: 5 | Status: SHIPPED | OUTPATIENT
Start: 2020-04-17 | End: 2020-05-10

## 2020-05-06 LAB
BASOPHILS # BLD AUTO: 20 CELLS/UL (ref 0–200)
BASOPHILS NFR BLD AUTO: 0.6 %
BUN SERPL-MCNC: 20 MG/DL (ref 7–25)
BUN/CREAT SERPL: 18 (CALC) (ref 6–22)
CALCIUM SERPL-MCNC: 9.4 MG/DL (ref 8.6–10.4)
CHLORIDE SERPL-SCNC: 109 MMOL/L (ref 98–110)
CO2 SERPL-SCNC: 29 MMOL/L (ref 20–32)
CREAT SERPL-MCNC: 1.11 MG/DL (ref 0.6–0.88)
EOSINOPHIL # BLD AUTO: 102 CELLS/UL (ref 15–500)
EOSINOPHIL NFR BLD AUTO: 3.1 %
ERYTHROCYTE [DISTWIDTH] IN BLOOD BY AUTOMATED COUNT: 12.6 % (ref 11–15)
GLUCOSE SERPL-MCNC: 78 MG/DL (ref 65–99)
HCT VFR BLD AUTO: 27.7 % (ref 35–45)
HGB BLD-MCNC: 8.8 G/DL (ref 11.7–15.5)
LYMPHOCYTES # BLD AUTO: 921 CELLS/UL (ref 850–3900)
LYMPHOCYTES NFR BLD AUTO: 27.9 %
MCH RBC QN AUTO: 31.2 PG (ref 27–33)
MCHC RBC AUTO-ENTMCNC: 31.8 G/DL (ref 32–36)
MCV RBC AUTO: 98.2 FL (ref 80–100)
MONOCYTES # BLD AUTO: 416 CELLS/UL (ref 200–950)
MONOCYTES NFR BLD AUTO: 12.6 %
NEUTROPHILS # BLD AUTO: 1841 CELLS/UL (ref 1500–7800)
NEUTROPHILS NFR BLD AUTO: 55.8 %
PLATELET # BLD AUTO: 174 THOUSAND/UL (ref 140–400)
PMV BLD REES-ECKER: 11 FL (ref 7.5–12.5)
POTASSIUM SERPL-SCNC: 4.3 MMOL/L (ref 3.5–5.3)
RBC # BLD AUTO: 2.82 MILLION/UL (ref 3.8–5.1)
SL AMB EGFR AFRICAN AMERICAN: 50 ML/MIN/1.73M2
SL AMB EGFR NON AFRICAN AMERICAN: 43 ML/MIN/1.73M2
SODIUM SERPL-SCNC: 142 MMOL/L (ref 135–146)
WBC # BLD AUTO: 3.3 THOUSAND/UL (ref 3.8–10.8)

## 2020-05-08 ENCOUNTER — TELEMEDICINE (OUTPATIENT)
Dept: INTERNAL MEDICINE CLINIC | Facility: CLINIC | Age: 85
End: 2020-05-08
Payer: COMMERCIAL

## 2020-05-08 DIAGNOSIS — D63.1 ANEMIA DUE TO STAGE 3 CHRONIC KIDNEY DISEASE (HCC): Primary | ICD-10-CM

## 2020-05-08 DIAGNOSIS — E78.00 PURE HYPERCHOLESTEROLEMIA: ICD-10-CM

## 2020-05-08 DIAGNOSIS — I48.0 PAROXYSMAL ATRIAL FIBRILLATION (HCC): ICD-10-CM

## 2020-05-08 DIAGNOSIS — I12.9 BENIGN HYPERTENSION WITH CHRONIC KIDNEY DISEASE, STAGE III (HCC): ICD-10-CM

## 2020-05-08 DIAGNOSIS — N18.30 ANEMIA DUE TO STAGE 3 CHRONIC KIDNEY DISEASE (HCC): Primary | ICD-10-CM

## 2020-05-08 DIAGNOSIS — H40.1130 PRIMARY OPEN ANGLE GLAUCOMA OF BOTH EYES, UNSPECIFIED GLAUCOMA STAGE: ICD-10-CM

## 2020-05-08 DIAGNOSIS — N18.30 BENIGN HYPERTENSION WITH CHRONIC KIDNEY DISEASE, STAGE III (HCC): ICD-10-CM

## 2020-05-08 DIAGNOSIS — I25.10 ARTERIOSCLEROTIC CARDIOVASCULAR DISEASE: ICD-10-CM

## 2020-05-08 PROCEDURE — 99215 OFFICE O/P EST HI 40 MIN: CPT | Performed by: INTERNAL MEDICINE

## 2020-05-08 PROCEDURE — 1160F RVW MEDS BY RX/DR IN RCRD: CPT | Performed by: INTERNAL MEDICINE

## 2020-05-08 RX ORDER — DORZOLAMIDE HYDROCHLORIDE AND TIMOLOL MALEATE 20; 5 MG/ML; MG/ML
SOLUTION/ DROPS OPHTHALMIC
Qty: 10 ML | Refills: 0 | Status: SHIPPED | OUTPATIENT
Start: 2020-05-08 | End: 2020-06-23

## 2020-05-08 RX ORDER — FOLIC ACID 1 MG/1
1000 TABLET ORAL DAILY
COMMUNITY
Start: 2020-03-11

## 2020-05-10 DIAGNOSIS — F41.9 ANXIETY: ICD-10-CM

## 2020-05-10 RX ORDER — CITALOPRAM 10 MG/1
TABLET ORAL
Qty: 30 TABLET | Refills: 5 | Status: SHIPPED | OUTPATIENT
Start: 2020-05-10 | End: 2020-10-15 | Stop reason: SDUPTHER

## 2020-05-21 LAB
BASOPHILS # BLD AUTO: 38 CELLS/UL (ref 0–200)
BASOPHILS NFR BLD AUTO: 1.2 %
EOSINOPHIL # BLD AUTO: 128 CELLS/UL (ref 15–500)
EOSINOPHIL NFR BLD AUTO: 4 %
ERYTHROCYTE [DISTWIDTH] IN BLOOD BY AUTOMATED COUNT: 12.6 % (ref 11–15)
HCT VFR BLD AUTO: 32.9 % (ref 35–45)
HGB BLD-MCNC: 10 G/DL (ref 11.7–15.5)
LYMPHOCYTES # BLD AUTO: 752 CELLS/UL (ref 850–3900)
LYMPHOCYTES NFR BLD AUTO: 23.5 %
MCH RBC QN AUTO: 30.4 PG (ref 27–33)
MCHC RBC AUTO-ENTMCNC: 30.4 G/DL (ref 32–36)
MCV RBC AUTO: 100 FL (ref 80–100)
MONOCYTES # BLD AUTO: 416 CELLS/UL (ref 200–950)
MONOCYTES NFR BLD AUTO: 13 %
NEUTROPHILS # BLD AUTO: 1866 CELLS/UL (ref 1500–7800)
NEUTROPHILS NFR BLD AUTO: 58.3 %
PLATELET # BLD AUTO: 183 THOUSAND/UL (ref 140–400)
PMV BLD REES-ECKER: 10.9 FL (ref 7.5–12.5)
RBC # BLD AUTO: 3.29 MILLION/UL (ref 3.8–5.1)
WBC # BLD AUTO: 3.2 THOUSAND/UL (ref 3.8–10.8)

## 2020-06-23 DIAGNOSIS — H40.1130 PRIMARY OPEN ANGLE GLAUCOMA OF BOTH EYES, UNSPECIFIED GLAUCOMA STAGE: ICD-10-CM

## 2020-06-23 RX ORDER — DORZOLAMIDE HYDROCHLORIDE AND TIMOLOL MALEATE 20; 5 MG/ML; MG/ML
SOLUTION/ DROPS OPHTHALMIC
Qty: 10 ML | Refills: 0 | Status: SHIPPED | OUTPATIENT
Start: 2020-06-23

## 2020-06-28 DIAGNOSIS — I10 ESSENTIAL HYPERTENSION: ICD-10-CM

## 2020-06-29 RX ORDER — AMLODIPINE BESYLATE 5 MG/1
TABLET ORAL
Qty: 90 TABLET | Refills: 0 | Status: SHIPPED | OUTPATIENT
Start: 2020-06-29 | End: 2020-09-24 | Stop reason: SDUPTHER

## 2020-07-04 DIAGNOSIS — I25.10 ARTERIOSCLEROTIC CARDIOVASCULAR DISEASE: ICD-10-CM

## 2020-07-04 DIAGNOSIS — I50.41 ACUTE COMBINED SYSTOLIC AND DIASTOLIC CONGESTIVE HEART FAILURE (HCC): ICD-10-CM

## 2020-07-06 RX ORDER — POTASSIUM CHLORIDE 20 MEQ/1
TABLET, EXTENDED RELEASE ORAL
Qty: 30 TABLET | Refills: 3 | Status: SHIPPED | OUTPATIENT
Start: 2020-07-06 | End: 2020-10-23 | Stop reason: SDUPTHER

## 2020-07-16 ENCOUNTER — TELEPHONE (OUTPATIENT)
Dept: INTERNAL MEDICINE CLINIC | Facility: CLINIC | Age: 85
End: 2020-07-16

## 2020-07-16 NOTE — TELEPHONE ENCOUNTER
Pt was informed by her pharmacy that the RA Iron 325 (65 fe) Mg tablet is no longer available at her pharmacy  She is wondering if she should purchase iron supplements over the counter or if Dr Boland will prescribe a different iron pill for? Pt uses the [facility] on [address]  Pt would appreciate a call back at [phone]      Thank you

## 2020-08-06 ENCOUNTER — TELEPHONE (OUTPATIENT)
Dept: INTERNAL MEDICINE CLINIC | Facility: CLINIC | Age: 85
End: 2020-08-06

## 2020-08-06 NOTE — TELEPHONE ENCOUNTER
Called the patient to make a follow up appt for sometime this month  I spoke to the pt's daughter and she stated she will talk to her mother and call us back because she is unsure about coming into the office  I did also remind her of the virtual visits

## 2020-08-14 ENCOUNTER — TELEMEDICINE (OUTPATIENT)
Dept: INTERNAL MEDICINE CLINIC | Facility: CLINIC | Age: 85
End: 2020-08-14
Payer: COMMERCIAL

## 2020-08-14 DIAGNOSIS — I50.41 ACUTE COMBINED SYSTOLIC AND DIASTOLIC CONGESTIVE HEART FAILURE (HCC): ICD-10-CM

## 2020-08-14 DIAGNOSIS — I12.9 BENIGN HYPERTENSION WITH CHRONIC KIDNEY DISEASE, STAGE III (HCC): ICD-10-CM

## 2020-08-14 DIAGNOSIS — E78.00 PURE HYPERCHOLESTEROLEMIA: ICD-10-CM

## 2020-08-14 DIAGNOSIS — R26.2 AMBULATORY DYSFUNCTION: ICD-10-CM

## 2020-08-14 DIAGNOSIS — N18.30 BENIGN HYPERTENSION WITH CHRONIC KIDNEY DISEASE, STAGE III (HCC): ICD-10-CM

## 2020-08-14 DIAGNOSIS — I25.10 ARTERIOSCLEROTIC CARDIOVASCULAR DISEASE: Primary | ICD-10-CM

## 2020-08-14 DIAGNOSIS — Z00.00 MEDICARE ANNUAL WELLNESS VISIT, SUBSEQUENT: ICD-10-CM

## 2020-08-14 DIAGNOSIS — R63.4 RAPID WEIGHT LOSS: ICD-10-CM

## 2020-08-14 DIAGNOSIS — N18.30 ANEMIA DUE TO STAGE 3 CHRONIC KIDNEY DISEASE (HCC): ICD-10-CM

## 2020-08-14 DIAGNOSIS — D63.1 ANEMIA DUE TO STAGE 3 CHRONIC KIDNEY DISEASE (HCC): ICD-10-CM

## 2020-08-14 PROCEDURE — 99441 PR PHYS/QHP TELEPHONE EVALUATION 5-10 MIN: CPT | Performed by: INTERNAL MEDICINE

## 2020-08-14 NOTE — ASSESSMENT & PLAN NOTE
Last testing that was done with the patient hemoglobin was 10 period we will be sending the patient a slip to recheck a hemoglobin and hematocrit prior to her next visit  Patient remains on an iron tablet daily  We feel that the anemia secondary to her chronic disease, chronic kidney disease

## 2020-08-14 NOTE — ASSESSMENT & PLAN NOTE
Patient does have a follow-up visit with her cardiologist in September of this year  Hopefully she will be able to make an actual in-person visit versus over the telephone  Patient states she is having no cardiac symptoms at this point time  No chest pain or pressure and no increasing shortness of breath  Patient was told if any change in her status to please call immediately our office or her cardiologist for evaluation

## 2020-08-14 NOTE — ASSESSMENT & PLAN NOTE
Patient's weight has been fluctuating  Patient states that when she did weigh herself this morning her weight was approximately 168  She states that she is getting adequate nutrition but does not always have a brisk appetite  Again patient will be going for routine lab testing including comprehensive metabolic profile, thyroid studies

## 2020-08-14 NOTE — ASSESSMENT & PLAN NOTE
Wt Readings from Last 3 Encounters:   01/03/20 78 9 kg (174 lb)   11/19/19 77 4 kg (170 lb 9 6 oz)   09/03/19 78 kg (172 lb)       Patient is denying any chest pain or pressure and no increasing shortness of breath, no increased swelling to the feet and ankles  She continues to follow her weight at home which is been stable and is actually decreased to the 160s  Again patient is urged to call us if any changes  We will send the patient a slip in the mail to check on her renal function prior to her next visit

## 2020-08-14 NOTE — ASSESSMENT & PLAN NOTE
Patient does have ambulatory dysfunction  Uses a walker around the house for ambulation  No recent falls  May be a candidate for re-evaluation by physical therapy for strengthening and stability

## 2020-08-14 NOTE — ASSESSMENT & PLAN NOTE
Patient is watching her cholesterol intake  She states she is not always perfect but is supervised by her daughter that restriction or intake of fats and cholesterol with her diet  We will check a lipid profile prior to her next visit

## 2020-08-14 NOTE — PROGRESS NOTES
Virtual Regular Visit      Assessment/Plan:    Problem List Items Addressed This Visit        Cardiovascular and Mediastinum    Arteriosclerotic cardiovascular disease - Primary    Relevant Orders    Comprehensive metabolic panel    Lipid panel    TSH, 3rd generation with Free T4 reflex    Benign hypertension with chronic kidney disease, stage III (HCC)    Relevant Orders    Comprehensive metabolic panel    CBC and differential    Lipid panel    UA (URINE) with reflex to Scope    TSH, 3rd generation with Free T4 reflex       Other    Hyperlipidemia    Relevant Orders    Lipid panel    Rapid weight loss    Relevant Orders    Comprehensive metabolic panel    CBC and differential    TSH, 3rd generation with Free T4 reflex    Medicare annual wellness visit, subsequent    Relevant Orders    Comprehensive metabolic panel    CBC and differential    Lipid panel    UA (URINE) with reflex to Scope    TSH, 3rd generation with Free T4 reflex    Anemia due to chronic kidney disease    Relevant Orders    CBC and differential               Reason for visit is No chief complaint on file  Encounter provider Trung Zabala DO    Provider located at [address] [id number]      Recent Visits  No visits were found meeting these conditions  Showing recent visits within past 7 days and meeting all other requirements     Future Appointments  No visits were found meeting these conditions  Showing future appointments within next 150 days and meeting all other requirements        The patient was identified by name and date of birth  Maame Chamberlain was informed that this is a telemedicine visit and that the visit is being conducted through [address] [place] [address] and patient was informed that this is not a secure, HIPAA-complaint platform  She agrees to proceed     My office door was closed  No one else was in the room    She acknowledged consent and understanding of privacy and security of the video platform  The patient has agreed to participate and understands they can discontinue the visit at any time  Patient is aware this is a billable service  Subjective  Cierra Martinez is a 80 y o  female being contacted today by tele video for re-evaluation   Patient is a 66-year-old female history of hypertension, hyperlipidemia, coronary artery disease status post bypass surgery in the distant past, chronic atrial fibrillation, DJD, 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  Patient is being contacted today by tele video for follow-up  Patient states in general she is doing about the same but she states that she does have some fatigue  She denies any chest pain or pressure and no increasing shortness of breath with exertion  She states appetite is unchanged  Has had some weight loss  No headaches, lightheadedness, dizziness    No recent falls       Past Medical History:   Diagnosis Date    Heart disease     High cholesterol     Hypertension        Past Surgical History:   Procedure Laterality Date    APPENDECTOMY      CARDIAC PACEMAKER PLACEMENT      CARDIAC SURGERY      NECK SURGERY      REDUCTION MAMMAPLASTY Bilateral     REPLACEMENT TOTAL KNEE         Current Outpatient Medications   Medication Sig Dispense Refill    amLODIPine (NORVASC) 5 mg tablet take 1 tablet by mouth once daily 90 tablet 0    apixaban (ELIQUIS) 2 5 mg TAKE 1 TABLET BY MOUTH 2 TIMES A DAY      aspirin (ECOTRIN LOW STRENGTH) 81 mg EC tablet Take 81 mg by mouth daily      atenolol (TENORMIN) 50 mg tablet take 1 tablet by mouth daily 90 tablet 3    atorvastatin (LIPITOR) 10 mg tablet take 1 tablet by mouth once daily 90 tablet 3    bimatoprost (LUMIGAN) 0 01 % ophthalmic drops 1 drop in both eyes at bedtime      cholecalciferol (VITAMIN D3) 1,000 units tablet Take 2,000 Units by mouth      citalopram (CeleXA) 10 mg tablet take 1 tablet by mouth once daily 30 tablet 5    cyanocobalamin 1000 MCG tablet Take 1,000 mcg by mouth      dorzolamide-timolol (COSOPT) 22 3-6 8 MG/ML ophthalmic solution instill 1 drop into both eyes twice a day 10 mL 0    folic acid (FOLVITE) 1 mg tablet Take 1,000 mcg by mouth daily      furosemide (LASIX) 20 mg tablet Take 1 tablet (20 mg total) by mouth daily 90 tablet 2    Misc  Devices (TRANSPORT CHAIR) MISC by Does not apply route as needed (Lower extremity weakness) Patient does have a history of degenerative arthritis and she does have problems with lower extremity weakness, via ambulatory dysfunction  Patient also has a history of coronary artery disease and is not able 1 each 0    omeprazole (PriLOSEC) 20 mg delayed release capsule Take 20 mg by mouth daily      potassium chloride (K-DUR,KLOR-CON) 20 mEq tablet take 1 tablet by mouth once daily 30 tablet 3    RA IRON 325 (65 Fe) MG tablet take 1 tablet by mouth once daily 30 tablet 5     No current facility-administered medications for this visit  No Known Allergies    Review of Systems   Constitutional: Positive for activity change (States that she has limited activity level  Has not been out of the house because of the COVID virus pandemic for the past 5 months) and unexpected weight change  Negative for appetite change, chills, diaphoresis, fatigue and fever  HENT: Negative  Eyes: Negative  Respiratory: Negative  Cardiovascular: Negative  Gastrointestinal: Negative  Endocrine: Negative  Genitourinary: Negative  Musculoskeletal: Negative  Skin: Negative  Allergic/Immunologic: Negative  Hematological: Negative  Psychiatric/Behavioral: Negative  Video Exam    There were no vitals filed for this visit  Physical Exam  Vitals signs and nursing note reviewed  Constitutional:       General: She is not in acute distress  Appearance: She is not ill-appearing, toxic-appearing or diaphoretic        Comments: Pleasant, cheerful 66-year-old female who is awake alert in no acute distress  Accompanied to the visit by her daughter   Chato Alvarado:      Head: Normocephalic  Neurological:      Mental Status: She is alert  Psychiatric:         Mood and Affect: Mood normal          Behavior: Behavior normal          Thought Content: Thought content normal          Judgment: Judgment normal           I spent 20 minutes directly with the patient during this visit      VIRTUAL VISIT Umre Hobbs acknowledges that she has consented to an online visit or consultation  She understands that the online visit is based solely on information provided by her, and that, in the absence of a face-to-face physical evaluation by the physician, the diagnosis she receives is both limited and provisional in terms of accuracy and completeness  This is not intended to replace a full medical face-to-face evaluation by the physician  Zainab Cleary understands and accepts these terms

## 2020-08-14 NOTE — ASSESSMENT & PLAN NOTE
Patient does have a history of hypertension, chronic kidney disease  Patient does not have the equipment to check her blood pressure at home and I did discuss this with both the patient and her daughter that this may be something that they may purchase in order to keep track of her blood pressure readings  Again we sent the patient a slip for a blood tests looking at her renal function prior to her next visit  She has not been going out the house and she is keeping well hydrated

## 2020-08-17 ENCOUNTER — TELEPHONE (OUTPATIENT)
Dept: INTERNAL MEDICINE CLINIC | Facility: CLINIC | Age: 85
End: 2020-08-17

## 2020-08-17 NOTE — TELEPHONE ENCOUNTER
Patient called, she would like to speak with you regarding a blister on her lip and a cough she developed yesterday  I offered patient a virtual visit and she said she already had one last week and didn't feel it was necessary to do another   She can be reached at [phone]

## 2020-08-21 DIAGNOSIS — I25.10 ARTERIOSCLEROTIC CARDIOVASCULAR DISEASE: ICD-10-CM

## 2020-08-21 DIAGNOSIS — I50.41 ACUTE COMBINED SYSTOLIC AND DIASTOLIC CONGESTIVE HEART FAILURE (HCC): ICD-10-CM

## 2020-08-21 RX ORDER — FUROSEMIDE 20 MG/1
20 TABLET ORAL DAILY
Qty: 90 TABLET | Refills: 2 | Status: SHIPPED | OUTPATIENT
Start: 2020-08-21 | End: 2021-05-13 | Stop reason: SDUPTHER

## 2020-09-16 LAB
ALBUMIN SERPL-MCNC: 3.8 G/DL (ref 3.6–5.1)
ALBUMIN/GLOB SERPL: 1.4 (CALC) (ref 1–2.5)
ALP SERPL-CCNC: 80 U/L (ref 37–153)
ALT SERPL-CCNC: 16 U/L (ref 6–29)
APPEARANCE UR: CLEAR
AST SERPL-CCNC: 24 U/L (ref 10–35)
BACTERIA UR QL AUTO: ABNORMAL /HPF
BASOPHILS # BLD AUTO: 49 CELLS/UL (ref 0–200)
BASOPHILS NFR BLD AUTO: 1.4 %
BILIRUB SERPL-MCNC: 0.4 MG/DL (ref 0.2–1.2)
BILIRUB UR QL STRIP: NEGATIVE
BUN SERPL-MCNC: 14 MG/DL (ref 7–25)
BUN/CREAT SERPL: 12 (CALC) (ref 6–22)
CALCIUM SERPL-MCNC: 9.4 MG/DL (ref 8.6–10.4)
CHLORIDE SERPL-SCNC: 105 MMOL/L (ref 98–110)
CHOLEST SERPL-MCNC: 165 MG/DL
CHOLEST/HDLC SERPL: 3.8 (CALC)
CO2 SERPL-SCNC: 25 MMOL/L (ref 20–32)
COLOR UR: YELLOW
CREAT SERPL-MCNC: 1.2 MG/DL (ref 0.6–0.88)
EOSINOPHIL # BLD AUTO: 161 CELLS/UL (ref 15–500)
EOSINOPHIL NFR BLD AUTO: 4.6 %
ERYTHROCYTE [DISTWIDTH] IN BLOOD BY AUTOMATED COUNT: 12.8 % (ref 11–15)
GLOBULIN SER CALC-MCNC: 2.8 G/DL (CALC) (ref 1.9–3.7)
GLUCOSE SERPL-MCNC: 83 MG/DL (ref 65–99)
GLUCOSE UR QL STRIP: NEGATIVE
HCT VFR BLD AUTO: 33.6 % (ref 35–45)
HDLC SERPL-MCNC: 44 MG/DL
HGB BLD-MCNC: 10.3 G/DL (ref 11.7–15.5)
HGB UR QL STRIP: NEGATIVE
HYALINE CASTS #/AREA URNS LPF: ABNORMAL /LPF
KETONES UR QL STRIP: NEGATIVE
LDLC SERPL CALC-MCNC: 104 MG/DL (CALC)
LEUKOCYTE ESTERASE UR QL STRIP: ABNORMAL
LYMPHOCYTES # BLD AUTO: 917 CELLS/UL (ref 850–3900)
LYMPHOCYTES NFR BLD AUTO: 26.2 %
MCH RBC QN AUTO: 30.3 PG (ref 27–33)
MCHC RBC AUTO-ENTMCNC: 30.7 G/DL (ref 32–36)
MCV RBC AUTO: 98.8 FL (ref 80–100)
MONOCYTES # BLD AUTO: 546 CELLS/UL (ref 200–950)
MONOCYTES NFR BLD AUTO: 15.6 %
NEUTROPHILS # BLD AUTO: 1827 CELLS/UL (ref 1500–7800)
NEUTROPHILS NFR BLD AUTO: 52.2 %
NITRITE UR QL STRIP: NEGATIVE
NONHDLC SERPL-MCNC: 121 MG/DL (CALC)
PH UR STRIP: 6 [PH] (ref 5–8)
PLATELET # BLD AUTO: 211 THOUSAND/UL (ref 140–400)
PMV BLD REES-ECKER: 10.7 FL (ref 7.5–12.5)
POTASSIUM SERPL-SCNC: 4.1 MMOL/L (ref 3.5–5.3)
PROT SERPL-MCNC: 6.6 G/DL (ref 6.1–8.1)
PROT UR QL STRIP: NEGATIVE
RBC # BLD AUTO: 3.4 MILLION/UL (ref 3.8–5.1)
RBC #/AREA URNS HPF: ABNORMAL /HPF
SL AMB EGFR AFRICAN AMERICAN: 45 ML/MIN/1.73M2
SL AMB EGFR NON AFRICAN AMERICAN: 39 ML/MIN/1.73M2
SODIUM SERPL-SCNC: 140 MMOL/L (ref 135–146)
SP GR UR STRIP: 1.01 (ref 1–1.03)
SQUAMOUS #/AREA URNS HPF: ABNORMAL /HPF
T4 FREE SERPL-MCNC: 1.1 NG/DL (ref 0.8–1.8)
TRIGL SERPL-MCNC: 84 MG/DL
TSH SERPL-ACNC: 5.01 MIU/L (ref 0.4–4.5)
WBC # BLD AUTO: 3.5 THOUSAND/UL (ref 3.8–10.8)
WBC #/AREA URNS HPF: ABNORMAL /HPF

## 2020-09-24 DIAGNOSIS — I10 ESSENTIAL HYPERTENSION: ICD-10-CM

## 2020-09-24 RX ORDER — AMLODIPINE BESYLATE 5 MG/1
5 TABLET ORAL DAILY
Qty: 90 TABLET | Refills: 3 | Status: SHIPPED | OUTPATIENT
Start: 2020-09-24

## 2020-10-15 DIAGNOSIS — F41.9 ANXIETY: ICD-10-CM

## 2020-10-15 RX ORDER — CITALOPRAM 10 MG/1
10 TABLET ORAL DAILY
Qty: 30 TABLET | Refills: 5 | Status: SHIPPED | OUTPATIENT
Start: 2020-10-15 | End: 2021-04-02 | Stop reason: SDUPTHER

## 2020-10-23 DIAGNOSIS — I25.10 ARTERIOSCLEROTIC CARDIOVASCULAR DISEASE: ICD-10-CM

## 2020-10-23 DIAGNOSIS — I50.41 ACUTE COMBINED SYSTOLIC AND DIASTOLIC CONGESTIVE HEART FAILURE (HCC): ICD-10-CM

## 2020-10-23 RX ORDER — POTASSIUM CHLORIDE 20 MEQ/1
20 TABLET, EXTENDED RELEASE ORAL DAILY
Qty: 30 TABLET | Refills: 3 | Status: SHIPPED | OUTPATIENT
Start: 2020-10-23 | End: 2021-02-16 | Stop reason: SDUPTHER

## 2020-12-07 ENCOUNTER — TELEPHONE (OUTPATIENT)
Dept: INTERNAL MEDICINE CLINIC | Facility: CLINIC | Age: 85
End: 2020-12-07

## 2020-12-08 DIAGNOSIS — I10 ESSENTIAL HYPERTENSION: ICD-10-CM

## 2020-12-08 DIAGNOSIS — D50.8 IRON DEFICIENCY ANEMIA SECONDARY TO INADEQUATE DIETARY IRON INTAKE: ICD-10-CM

## 2020-12-08 DIAGNOSIS — D50.8 IRON DEFICIENCY ANEMIA SECONDARY TO INADEQUATE DIETARY IRON INTAKE: Primary | ICD-10-CM

## 2020-12-08 DIAGNOSIS — E78.5 HYPERLIPIDEMIA, UNSPECIFIED HYPERLIPIDEMIA TYPE: ICD-10-CM

## 2020-12-08 RX ORDER — FERROUS SULFATE 325(65) MG
1 TABLET ORAL DAILY
Qty: 30 TABLET | Refills: 5 | Status: SHIPPED | OUTPATIENT
Start: 2020-12-08 | End: 2021-06-01 | Stop reason: SDUPTHER

## 2021-02-12 ENCOUNTER — TELEPHONE (OUTPATIENT)
Dept: INTERNAL MEDICINE CLINIC | Facility: CLINIC | Age: 86
End: 2021-02-12

## 2021-02-12 DIAGNOSIS — I50.41 ACUTE COMBINED SYSTOLIC AND DIASTOLIC CONGESTIVE HEART FAILURE (HCC): ICD-10-CM

## 2021-02-12 DIAGNOSIS — R26.2 AMBULATORY DYSFUNCTION: Primary | ICD-10-CM

## 2021-02-12 NOTE — TELEPHONE ENCOUNTER
Spoke to patients daughter Evaristo Miller per phone call from home nurse from patients medicaid plan  Evaristo Miller stated her Mom could benefit as the nurse from home physical therapy for her arms and legs  Daughter wanted to know if you could place a order for home physical therapy through [facility]  If you have any questions Evaristo Miller can be reached at [phone]

## 2021-02-16 ENCOUNTER — TELEPHONE (OUTPATIENT)
Dept: FAMILY MEDICINE CLINIC | Facility: HOSPITAL | Age: 86
End: 2021-02-16

## 2021-02-16 DIAGNOSIS — I25.10 ARTERIOSCLEROTIC CARDIOVASCULAR DISEASE: ICD-10-CM

## 2021-02-16 DIAGNOSIS — I50.41 ACUTE COMBINED SYSTOLIC AND DIASTOLIC CONGESTIVE HEART FAILURE (HCC): ICD-10-CM

## 2021-02-16 RX ORDER — POTASSIUM CHLORIDE 20 MEQ/1
20 TABLET, EXTENDED RELEASE ORAL DAILY
Qty: 30 TABLET | Refills: 3 | Status: SHIPPED | OUTPATIENT
Start: 2021-02-16 | End: 2021-06-15 | Stop reason: SDUPTHER

## 2021-02-16 NOTE — TELEPHONE ENCOUNTER
[facility] called and stated since you did not see patient that they will not go out and do a consultation for home eval for services ie physical therapy  Do you want me to send a referal over to [facility] for patient for consult and ie  Physical therapy?

## 2021-02-19 ENCOUNTER — PATIENT OUTREACH (OUTPATIENT)
Dept: INTERNAL MEDICINE CLINIC | Facility: CLINIC | Age: 86
End: 2021-02-19

## 2021-02-19 ENCOUNTER — TELEPHONE (OUTPATIENT)
Dept: INTERNAL MEDICINE CLINIC | Facility: CLINIC | Age: 86
End: 2021-02-19

## 2021-02-19 NOTE — PROGRESS NOTES
Claudine message received from [place], [place] with PCP office who stated patient was not approved for [facility] or [address] [place] [address] Rehab services due to insurance coverage  Patient's insurance is not contracted with [facility]  [facility] SW outreached [facility] to discuss patient's case  Spoke with Intake who stated patient would need to be seen by PCP (in-person vs virtual video visit; telephone visit will not be accepted by [facility]/insurance) in order for insurance to cover [facility] needs  Once patient is seen by PCP, Home Health order would need to be resent to [facility] with supporting clinical documentation for [facility] needs  [facility] SW outreached PCP office and spoke with Joaquin Middleton to inform of the above information  Joaquin Middleton will relay update to Dr Coni Kirk  [facility] SW will remain available to assist as needed

## 2021-02-19 NOTE — TELEPHONE ENCOUNTER
Fred Da Silva is returning [facility] [employer] [facility]'s call  She says that visiting nursing services were denied by the pt's insurance because she has not been seen by Luma Alford since August and needs to be evaluated by PCP  Fred Da Silva can be reached at [phone] if there any questions  Would you like me to call pt to schedule an appt?     Thanks

## 2021-02-19 NOTE — TELEPHONE ENCOUNTER
Pts daughter wanted her to have some home physical therapy and I am having a hard time getting anybody to take her case  I tried [address] and they both could not take her on as a patient  I wondered if you could help me with this  The VNA and PT referrals are in her chart  I think it is due to her insurance

## 2021-03-01 NOTE — PROGRESS NOTES
Assessment & Plan:     I48 0  Paroxysmal atrial fibrillation ([facility] 75 )  I have evaluated the patient and found the condition to be: Stable  I have evaluated the patient and: Adjusted the medications as indicated and Recommended continue with same treatment plan  The patient should continue treatment and follow-up with:   Heart rate is controlled  Patient remains on oral anticoagulants  Does have a follow-up with cardiology in the near future    I12 9  N18 30  Benign hypertension with chronic kidney disease, stage III  I have evaluated the patient and found the condition to be: Stable  I have evaluated the patient and: Adjusted medications as indicated, Recommended continue with same treatment plan and Ordered additional services/ studies  The patient should continue treatment and follow-up with:  patient will have labs in next few weeks checking on her renal status  He, blood pressure is controlled    I25 10  Arteriosclerotic cardiovascular disease  I have evaluated the patient and found the condition to be: Stable  I have evaluated the patient and: Adjusted medications as indicated and Recommended continue with same treatment plan  The patient should continue treatment and follow-up with:  asymptomatic  Has follow-up visit to be seen by Cardiology in the near future    I50 41  Acute combined systolic and diastolic congestive heart failure ([facility] )  I have evaluated the patient and found the condition to be: Stable  I have evaluated the patient and: Adjusted medications as indicated and Recommended continue with same treatment plan  The patient should continue treatment and follow-up with:  no increase in weight, no increased swelling to the feet or ankles  No increasing shortness of breath  Check on her renal status  F41 9  Anxiety  I have evaluated the patient and found the condition to be:  Stable  I have evaluated the patient and: Adjusted medications as indicated and Recommended continue with same treatment plan  The patient should continue treatment and follow-up with: Will continue present medication  Has adjusted well to the COVID virus pandemic     E78 5  Hyperlipidemia  I have evaluated the patient and found the condition to be: Stable  I have evaluated the patient and: Adjusted medications as indicated and Recommended continue with same treatment plan  The patient should continue treatment and follow-up with:   Patient has had no recent studies performed looking at her cholesterol  She had been stable previously  Will continue present medication    N18 9  D63 1  Anemia due to chronic kidney disease  I have evaluated the patient and found the condition to be: Stable  I have evaluated the patient and: Adjusted medications as indicated and Recommended continue with same treatment plan  The patient should continue treatment and follow-up with:  has not had lab test performed recently we will check a CBC  Patient denies any abnormal bruising or bleeding  R26 2  Ambulatory dysfunction  I have evaluated the patient and found the condition to be: Worsening  I have evaluated the patient and: Ordered additional services/ studies and Recommended continue with same treatment plan  The patient should continue treatment and follow-up with: With patient having some instability with gait we did try to arrange for in-home services which was denied by her insurance  Hopefully patient can either be seen in outpatient rehab for further evaluation and treatment or [facility] she will try to help the patient become more active at home         Subjective:     Patient ID: Jessica Ley is a 80 y o  female     No chief complaint on file  27-year-old female history of multiple medical problems as outlined previously  Patient is here today for a routine follow-up  Has not been seen for prolonged period of time because the COVID virus pandemic  Patient is very happy that she has received her 1st COVID virus vaccine  Presents with no new medical complaints or concerns      Review of Systems   Constitutional: Positive for activity change ( patient admits that she is more sedentary than in the past   Spending a little bit more time in bed where she feels more comfortable)  Negative for appetite change, chills, diaphoresis, fatigue, fever and unexpected weight change  HENT: Negative  Eyes: Negative  Respiratory: Negative  Cardiovascular: Positive for leg swelling ( chronic edema bilateral feet and ankles without change)  Negative for chest pain and palpitations  Gastrointestinal: Negative  Endocrine: Negative  Genitourinary: Negative  Musculoskeletal: Positive for arthralgias and gait problem  Negative for back pain, joint swelling, myalgias, neck pain and neck stiffness  Some mild diffuse arthritic aches and pains but nothing new or disabling   Skin: Negative  Allergic/Immunologic: Negative  Neurological: Negative for dizziness, tremors, seizures, syncope, facial asymmetry, speech difficulty, weakness, light-headedness, numbness and headaches  Hematological: Negative  Psychiatric/Behavioral: Negative  Objective: There were no vitals taken for this visit  Problem List Items Addressed This Visit     None          Physical Exam  Vitals signs and nursing note reviewed  Constitutional:       General: She is not in acute distress  Appearance: Normal appearance  She is not ill-appearing, toxic-appearing or diaphoretic  Comments:  Pleasant cheerful, mildly overweight 68-year-old female who is awake alert no acute distress and oriented x3   HENT:      Head: Normocephalic and atraumatic  Right Ear: Tympanic membrane, ear canal and external ear normal  There is no impacted cerumen  Left Ear: Tympanic membrane, ear canal and external ear normal  There is no impacted cerumen  Nose: Nose normal  No congestion or rhinorrhea        Mouth/Throat:      Mouth: Mucous membranes are moist       Pharynx: Oropharynx is clear  No oropharyngeal exudate or posterior oropharyngeal erythema  Eyes:      General: No scleral icterus  Right eye: No discharge  Left eye: No discharge  Extraocular Movements: Extraocular movements intact  Conjunctiva/sclera: Conjunctivae normal       Pupils: Pupils are equal, round, and reactive to light  Comments:  Ptosis bilaterally upper eyelids   Neck:      Musculoskeletal: Normal range of motion and neck supple  No neck rigidity or muscular tenderness  Vascular: No carotid bruit  Cardiovascular:      Rate and Rhythm: Normal rate  Rhythm irregular  Heart sounds: Murmur present  No friction rub  No gallop  Pulmonary:      Effort: Pulmonary effort is normal  No respiratory distress  Breath sounds: Normal breath sounds  No stridor  No wheezing, rhonchi or rales  Chest:      Chest wall: No tenderness  Abdominal:      General: Bowel sounds are normal  There is no distension  Palpations: Abdomen is soft  There is no mass  Tenderness: There is no abdominal tenderness  There is no right CVA tenderness, left CVA tenderness, guarding or rebound  Hernia: No hernia is present  Comments:  overweight   Musculoskeletal:         General: Deformity present  No swelling, tenderness or signs of injury  Right lower leg: No edema  Left lower leg: No edema  Lymphadenopathy:      Cervical: No cervical adenopathy  Skin:     General: Skin is warm and dry  Coloration: Skin is not jaundiced or pale  Findings: No bruising, erythema, lesion or rash  Neurological:      Mental Status: She is alert  Mental status is at baseline  Psychiatric:         Mood and Affect: Mood normal          Behavior: Behavior normal          Thought Content: Thought content normal          Judgment: Judgment normal        BMI Counseling: Body mass index is 26 63 kg/m²   The BMI is above normal  Nutrition recommendations include reducing portion sizes and decreasing overall calorie intake

## 2021-03-08 ENCOUNTER — OFFICE VISIT (OUTPATIENT)
Dept: INTERNAL MEDICINE CLINIC | Facility: CLINIC | Age: 86
End: 2021-03-08
Payer: COMMERCIAL

## 2021-03-08 VITALS
HEIGHT: 66 IN | TEMPERATURE: 97.4 F | BODY MASS INDEX: 26.52 KG/M2 | OXYGEN SATURATION: 97 % | DIASTOLIC BLOOD PRESSURE: 60 MMHG | WEIGHT: 165 LBS | HEART RATE: 74 BPM | SYSTOLIC BLOOD PRESSURE: 136 MMHG

## 2021-03-08 DIAGNOSIS — I50.41 ACUTE COMBINED SYSTOLIC AND DIASTOLIC CONGESTIVE HEART FAILURE (HCC): ICD-10-CM

## 2021-03-08 DIAGNOSIS — I12.9 BENIGN HYPERTENSION WITH CHRONIC KIDNEY DISEASE, STAGE III (HCC): ICD-10-CM

## 2021-03-08 DIAGNOSIS — R26.2 AMBULATORY DYSFUNCTION: Primary | ICD-10-CM

## 2021-03-08 DIAGNOSIS — E66.3 OVERWEIGHT (BMI 25.0-29.9): ICD-10-CM

## 2021-03-08 DIAGNOSIS — N18.31 ANEMIA DUE TO STAGE 3A CHRONIC KIDNEY DISEASE (HCC): ICD-10-CM

## 2021-03-08 DIAGNOSIS — N18.30 BENIGN HYPERTENSION WITH CHRONIC KIDNEY DISEASE, STAGE III (HCC): ICD-10-CM

## 2021-03-08 DIAGNOSIS — D63.1 ANEMIA DUE TO STAGE 3A CHRONIC KIDNEY DISEASE (HCC): ICD-10-CM

## 2021-03-08 DIAGNOSIS — I25.10 ARTERIOSCLEROTIC CARDIOVASCULAR DISEASE: ICD-10-CM

## 2021-03-08 DIAGNOSIS — I48.0 PAROXYSMAL ATRIAL FIBRILLATION (HCC): ICD-10-CM

## 2021-03-08 PROCEDURE — 99214 OFFICE O/P EST MOD 30 MIN: CPT | Performed by: INTERNAL MEDICINE

## 2021-03-08 NOTE — ASSESSMENT & PLAN NOTE
Patient is heart rate is showing good control  She remains on oral anticoagulants  No abnormal bleeding or bruising    We will check a CBC to make sure that there is no evidence of anemia

## 2021-03-08 NOTE — ASSESSMENT & PLAN NOTE
Wt Readings from Last 3 Encounters:   03/08/21 74 8 kg (165 lb)   01/03/20 78 9 kg (174 lb)   11/19/19 77 4 kg (170 lb 9 6 oz)      patient does have a slight swelling to both ankles which she states has been chronic  No increasing shortness of breath, O2 sat is stable  Weight is also stable  Patient will continue present medication and surveillance    We will continue to monitor her renal function closely

## 2021-03-08 NOTE — ASSESSMENT & PLAN NOTE
History of coronary artery disease status post bypass surgery in the past   Patient does have a follow-up visit to be seen by Cardiology in the near future  She denies any chest pain or pressure and no increasing shortness breath with exertion    Patient will continue present medication and surveillance

## 2021-03-08 NOTE — ASSESSMENT & PLAN NOTE
Lab Results   Component Value Date    CREATININE 1 20 (H) 09/15/2020    CREATININE 1 11 (H) 05/05/2020    CREATININE 1 13 (H) 12/16/2019    patient has had a gradual decline in her  Renal function  She will be going for lab testing in the near future to check on her kidney status    Will continue with present medication his time period follow-up with nephrology

## 2021-03-08 NOTE — ASSESSMENT & PLAN NOTE
We have attempted to arrange for in-home physical therapy to help with patient's stability  Her insurance company has refused coverage for this  Her daughter is hoping to arrange for evaluation at outpatient physical therapy or do therapy with the patient herself at home

## 2021-03-08 NOTE — ASSESSMENT & PLAN NOTE
Patient's weight has been stable  Her daughter make sure the patient gets adequate nutrition  Patient has no GI complaints

## 2021-03-24 DIAGNOSIS — I10 ESSENTIAL HYPERTENSION: ICD-10-CM

## 2021-03-24 RX ORDER — ATENOLOL 50 MG/1
50 TABLET ORAL DAILY
Qty: 90 TABLET | Refills: 3 | Status: SHIPPED | OUTPATIENT
Start: 2021-03-24 | End: 2022-03-11 | Stop reason: SDUPTHER

## 2021-04-02 DIAGNOSIS — F41.9 ANXIETY: ICD-10-CM

## 2021-04-02 DIAGNOSIS — E78.5 HYPERLIPIDEMIA, UNSPECIFIED HYPERLIPIDEMIA TYPE: ICD-10-CM

## 2021-04-02 RX ORDER — CITALOPRAM 10 MG/1
10 TABLET ORAL DAILY
Qty: 30 TABLET | Refills: 5 | Status: SHIPPED | OUTPATIENT
Start: 2021-04-02 | End: 2021-09-20 | Stop reason: SDUPTHER

## 2021-04-02 RX ORDER — ATORVASTATIN CALCIUM 10 MG/1
10 TABLET, FILM COATED ORAL DAILY
Qty: 90 TABLET | Refills: 3 | Status: SHIPPED | OUTPATIENT
Start: 2021-04-02 | End: 2022-03-09 | Stop reason: SDUPTHER

## 2021-05-13 DIAGNOSIS — I25.10 ARTERIOSCLEROTIC CARDIOVASCULAR DISEASE: ICD-10-CM

## 2021-05-13 DIAGNOSIS — I50.41 ACUTE COMBINED SYSTOLIC AND DIASTOLIC CONGESTIVE HEART FAILURE (HCC): ICD-10-CM

## 2021-05-13 RX ORDER — FUROSEMIDE 20 MG/1
20 TABLET ORAL DAILY
Qty: 90 TABLET | Refills: 2 | Status: SHIPPED | OUTPATIENT
Start: 2021-05-13 | End: 2022-02-02 | Stop reason: SDUPTHER

## 2021-06-01 ENCOUNTER — RA CDI HCC (OUTPATIENT)
Dept: OTHER | Facility: HOSPITAL | Age: 86
End: 2021-06-01

## 2021-06-01 DIAGNOSIS — D50.8 IRON DEFICIENCY ANEMIA SECONDARY TO INADEQUATE DIETARY IRON INTAKE: ICD-10-CM

## 2021-06-01 RX ORDER — FERROUS SULFATE 325(65) MG
1 TABLET ORAL DAILY
Qty: 30 TABLET | Refills: 5 | Status: SHIPPED | OUTPATIENT
Start: 2021-06-01 | End: 2021-11-22 | Stop reason: SDUPTHER

## 2021-06-08 ENCOUNTER — OFFICE VISIT (OUTPATIENT)
Dept: INTERNAL MEDICINE CLINIC | Facility: CLINIC | Age: 86
End: 2021-06-08
Payer: COMMERCIAL

## 2021-06-08 VITALS
OXYGEN SATURATION: 99 % | SYSTOLIC BLOOD PRESSURE: 134 MMHG | HEIGHT: 66 IN | DIASTOLIC BLOOD PRESSURE: 76 MMHG | HEART RATE: 63 BPM | TEMPERATURE: 98.1 F | WEIGHT: 162 LBS | BODY MASS INDEX: 26.03 KG/M2

## 2021-06-08 DIAGNOSIS — N18.31 ANEMIA DUE TO STAGE 3A CHRONIC KIDNEY DISEASE (HCC): ICD-10-CM

## 2021-06-08 DIAGNOSIS — D63.1 ANEMIA DUE TO STAGE 3A CHRONIC KIDNEY DISEASE (HCC): ICD-10-CM

## 2021-06-08 DIAGNOSIS — N18.30 BENIGN HYPERTENSION WITH CHRONIC KIDNEY DISEASE, STAGE III (HCC): ICD-10-CM

## 2021-06-08 DIAGNOSIS — R26.2 AMBULATORY DYSFUNCTION: ICD-10-CM

## 2021-06-08 DIAGNOSIS — I48.0 PAROXYSMAL ATRIAL FIBRILLATION (HCC): Primary | ICD-10-CM

## 2021-06-08 DIAGNOSIS — I50.41 ACUTE COMBINED SYSTOLIC AND DIASTOLIC CONGESTIVE HEART FAILURE (HCC): ICD-10-CM

## 2021-06-08 DIAGNOSIS — I12.9 BENIGN HYPERTENSION WITH CHRONIC KIDNEY DISEASE, STAGE III (HCC): ICD-10-CM

## 2021-06-08 DIAGNOSIS — Z00.00 MEDICARE ANNUAL WELLNESS VISIT, SUBSEQUENT: ICD-10-CM

## 2021-06-08 PROCEDURE — 1160F RVW MEDS BY RX/DR IN RCRD: CPT | Performed by: INTERNAL MEDICINE

## 2021-06-08 PROCEDURE — 99214 OFFICE O/P EST MOD 30 MIN: CPT | Performed by: INTERNAL MEDICINE

## 2021-06-08 PROCEDURE — 1036F TOBACCO NON-USER: CPT | Performed by: INTERNAL MEDICINE

## 2021-06-08 NOTE — PROGRESS NOTES
Assessment/Plan:    Acute combined systolic and diastolic congestive heart failure (HCC)  Wt Readings from Last 3 Encounters:   06/08/21 73 5 kg (162 lb)   03/08/21 74 8 kg (165 lb)   01/03/20 78 9 kg (174 lb)      patient continues to follow-up cardiology  She states that when she drinks too much water fluids during the day she does have some slight shortness breath when she lays down at nighttime  She is trying to gauge the proper amount fluid not cause problems with the hydration or further problems with her renal function  At this point denies any increasing shortness no increasing swelling to feet and ankles  Ambulatory dysfunction    Patient presents today walking with a walker  She does relate she cannot stand or ambulate for long period time  She further relates that this limits her independence  No new arthritic aches or pains  Anemia due to chronic kidney disease    Chronic anemia with  No significant change in her hemoglobin  Patient continues to take her iron tablet daily we will check a hemoglobin with her next visit    Benign hypertension with chronic kidney disease, stage III ([facility] )  Lab Results   Component Value Date    CREATININE 1 20 (H) 09/15/2020    CREATININE 1 11 (H) 05/05/2020    CREATININE 1 13 (H) 12/16/2019    patient continues to follow-up Nephrology  Creatinine is elevated from previous readings  Again patient is trying to adjust her fluid balance so that she is not fluid overloaded and a stays out of congestive heart failure but getting adequate hydration    Medicare annual wellness visit, subsequent   The patient will be due for repeat Medicare wellness visit when she returns to the office in 4 months  Patient was given a slip for labs to be performed prior to the visit  She will continue with present medication and surveillance  Paroxysmal atrial fibrillation (HCC)    Heart rate is stable rhythm is a regular period continues to follow-up with cardiology    She remains on Eliquis as an anti coagulant       Diagnoses and all orders for this visit:    Paroxysmal atrial fibrillation ([facility] )  -     CBC and differential; Future    Medicare annual wellness visit, subsequent  -     Comprehensive metabolic panel; Future  -     CBC and differential; Future  -     Lipid panel; Future  -     UA (URINE) with reflex to Scope; Future    Acute combined systolic and diastolic congestive heart failure (HCC)    Ambulatory dysfunction    Anemia due to stage 3a chronic kidney disease ([facility] )    Benign hypertension with chronic kidney disease, stage III (HCC)          Subjective:      Patient ID: Fadi Montague is a 80 y o  female  Patient is a 79-year-old female history of medical problems as outlined previously  Patient is here today for routine follow-up accompanied by her daughter  Patient did have labs performed prior to the visit today we did discuss the results  Patient continues to follow-up with her cardiologist   She admits to being somewhat frustrated with her inability to take care of herself, self-care  States that if she drinks too much fluids that she does get some shortness of breath when she lays down in bed  No significant increased swelling to the feet and ankles and she states her appetite is been stable but is not robust      The following portions of the patient's history were reviewed and updated as appropriate:   She  has a past medical history of Heart disease, High cholesterol, and Hypertension    She   Patient Active Problem List    Diagnosis Date Noted    Anemia due to chronic kidney disease 12/19/2019    Bronchitis 10/14/2019    Medicare annual wellness visit, subsequent 08/13/2019    Ambulatory dysfunction 06/07/2019    Acute combined systolic and diastolic congestive heart failure ([facility] ) 06/07/2019    Anxiety 05/14/2019    Overweight (BMI 25 0-29 9) 04/03/2019    Rapid weight loss 06/28/2018    Paroxysmal atrial fibrillation ([facility] ) 09/05/2017    Benign hypertension with chronic kidney disease, stage III ([facility] ) 06/09/2017    Hyperlipidemia 06/24/2013    Arteriosclerotic cardiovascular disease 11/12/2012     She  has a past surgical history that includes Appendectomy; Reduction mammaplasty (Bilateral); Cardiac surgery; Replacement total knee; Neck surgery; and Cardiac pacemaker placement  Her family history includes Aneurysm in her brother; Other in her father  She  reports that she has never smoked  She has never used smokeless tobacco  She reports previous alcohol use  She reports that she does not use drugs  Current Outpatient Medications   Medication Sig Dispense Refill    apixaban (ELIQUIS) 2 5 mg TAKE 1 TABLET BY MOUTH 2 TIMES A DAY      aspirin (ECOTRIN LOW STRENGTH) 81 mg EC tablet Take 81 mg by mouth daily      atenolol (TENORMIN) 50 mg tablet Take 1 tablet (50 mg total) by mouth daily 90 tablet 3    atorvastatin (LIPITOR) 10 mg tablet Take 1 tablet (10 mg total) by mouth daily 90 tablet 3    bimatoprost (LUMIGAN) 0 01 % ophthalmic drops 1 drop in both eyes at bedtime      cholecalciferol (VITAMIN D3) 1,000 units tablet Take 2,000 Units by mouth      citalopram (CeleXA) 10 mg tablet Take 1 tablet (10 mg total) by mouth daily 30 tablet 5    cyanocobalamin 1000 MCG tablet Take 1,000 mcg by mouth      dorzolamide-timolol (COSOPT) 22 3-6 8 MG/ML ophthalmic solution instill 1 drop into both eyes twice a day 10 mL 0    ferrous sulfate (RA Iron) 325 (65 Fe) mg tablet Take 1 tablet (325 mg total) by mouth daily 30 tablet 5    folic acid (FOLVITE) 1 mg tablet Take 1,000 mcg by mouth daily      furosemide (Lasix) 20 mg tablet Take 1 tablet (20 mg total) by mouth daily 90 tablet 2    Misc  Devices (TRANSPORT CHAIR) MISC by Does not apply route as needed (Lower extremity weakness) Patient does have a history of degenerative arthritis and she does have problems with lower extremity weakness, via ambulatory dysfunction    Patient also has a history of coronary artery disease and is not able 1 each 0    omeprazole (PriLOSEC) 20 mg delayed release capsule Take 20 mg by mouth daily      potassium chloride (K-DUR,KLOR-CON) 20 mEq tablet Take 1 tablet (20 mEq total) by mouth daily 30 tablet 3    amLODIPine (NORVASC) 5 mg tablet Take 1 tablet (5 mg total) by mouth daily (Patient not taking: Reported on 6/8/2021) 90 tablet 3     No current facility-administered medications for this visit  Current Outpatient Medications on File Prior to Visit   Medication Sig    apixaban (ELIQUIS) 2 5 mg TAKE 1 TABLET BY MOUTH 2 TIMES A DAY    aspirin (ECOTRIN LOW STRENGTH) 81 mg EC tablet Take 81 mg by mouth daily    atenolol (TENORMIN) 50 mg tablet Take 1 tablet (50 mg total) by mouth daily    atorvastatin (LIPITOR) 10 mg tablet Take 1 tablet (10 mg total) by mouth daily    bimatoprost (LUMIGAN) 0 01 % ophthalmic drops 1 drop in both eyes at bedtime    cholecalciferol (VITAMIN D3) 1,000 units tablet Take 2,000 Units by mouth    citalopram (CeleXA) 10 mg tablet Take 1 tablet (10 mg total) by mouth daily    cyanocobalamin 1000 MCG tablet Take 1,000 mcg by mouth    dorzolamide-timolol (COSOPT) 22 3-6 8 MG/ML ophthalmic solution instill 1 drop into both eyes twice a day    ferrous sulfate (RA Iron) 325 (65 Fe) mg tablet Take 1 tablet (325 mg total) by mouth daily    folic acid (FOLVITE) 1 mg tablet Take 1,000 mcg by mouth daily    furosemide (Lasix) 20 mg tablet Take 1 tablet (20 mg total) by mouth daily    Misc  Devices (TRANSPORT CHAIR) MISC by Does not apply route as needed (Lower extremity weakness) Patient does have a history of degenerative arthritis and she does have problems with lower extremity weakness, via ambulatory dysfunction    Patient also has a history of coronary artery disease and is not able    omeprazole (PriLOSEC) 20 mg delayed release capsule Take 20 mg by mouth daily    potassium chloride (K-DUR,KLOR-CON) 20 mEq tablet Take 1 tablet (20 mEq total) by mouth daily    amLODIPine (NORVASC) 5 mg tablet Take 1 tablet (5 mg total) by mouth daily (Patient not taking: Reported on 6/8/2021)     No current facility-administered medications on file prior to visit  She has No Known Allergies       Review of Systems   Constitutional: Negative  HENT: Negative  Eyes: Negative  Respiratory: Negative  Cardiovascular: Negative  Gastrointestinal: Negative  Endocrine: Negative  Genitourinary: Negative  Musculoskeletal: Negative  Skin: Negative  Allergic/Immunologic: Negative  Neurological: Negative  Hematological: Negative  Psychiatric/Behavioral: Negative  Objective:      /76   Pulse 63   Temp 98 1 °F (36 7 °C)   Ht 5' 6" (1 676 m)   Wt 73 5 kg (162 lb)   SpO2 99%   BMI 26 15 kg/m²          Physical Exam  Vitals signs and nursing note reviewed  Constitutional:       General: She is not in acute distress  Appearance: Normal appearance  She is not ill-appearing, toxic-appearing or diaphoretic  Comments: Pleasant articulate slightly overweight 44-year-old female who is awake alert no acute distress and oriented x3, slightly hard of hearing  Walking with walker   HENT:      Head: Normocephalic and atraumatic  Right Ear: Tympanic membrane, ear canal and external ear normal  There is no impacted cerumen  Left Ear: Tympanic membrane, ear canal and external ear normal  There is no impacted cerumen  Nose: Nose normal  No congestion or rhinorrhea  Mouth/Throat:      Mouth: Mucous membranes are moist       Pharynx: Oropharynx is clear  No oropharyngeal exudate or posterior oropharyngeal erythema  Eyes:      General: No scleral icterus  Right eye: No discharge  Left eye: No discharge  Extraocular Movements: Extraocular movements intact  Conjunctiva/sclera: Conjunctivae normal       Pupils: Pupils are equal, round, and reactive to light     Neck: Musculoskeletal: Normal range of motion and neck supple  No neck rigidity or muscular tenderness  Vascular: No carotid bruit  Cardiovascular:      Rate and Rhythm: Normal rate  Rhythm irregular  Pulses: Normal pulses  Heart sounds: Murmur present  No friction rub  No gallop  Pulmonary:      Effort: Pulmonary effort is normal  No respiratory distress  Breath sounds: No stridor  No wheezing, rhonchi or rales  Comments: Some decreased breath sounds anteriorly and posteriorly but no rales rhonchi or wheezes could be appreciated on exam  Chest:      Chest wall: No tenderness  Abdominal:      General: Bowel sounds are normal  There is no distension  Palpations: Abdomen is soft  There is no mass  Tenderness: There is no abdominal tenderness  There is no right CVA tenderness, left CVA tenderness, guarding or rebound  Hernia: No hernia is present  Musculoskeletal:         General: Deformity ( diffuse arthritic changes nothing acute) present  No swelling, tenderness or signs of injury  Right lower leg: Edema ( trace pittingEdema bilateral lower extremities) present  Left lower leg: Edema present  Lymphadenopathy:      Cervical: No cervical adenopathy  Skin:     General: Skin is warm and dry  Capillary Refill: Capillary refill takes less than 2 seconds  Coloration: Skin is not jaundiced or pale  Findings: No bruising, erythema, lesion or rash  Neurological:      Mental Status: She is alert and oriented to person, place, and time  Mental status is at baseline  Cranial Nerves: No cranial nerve deficit  Sensory: No sensory deficit  Motor: No weakness  Coordination: Coordination normal       Gait: Gait normal       Deep Tendon Reflexes: Reflexes normal    Psychiatric:         Mood and Affect: Mood normal          Behavior: Behavior normal          Thought Content:  Thought content normal          Judgment: Judgment normal

## 2021-06-08 NOTE — ASSESSMENT & PLAN NOTE
Heart rate is stable rhythm is a regular period continues to follow-up with cardiology    She remains on Eliquis as an anti coagulant

## 2021-06-08 NOTE — ASSESSMENT & PLAN NOTE
Lab Results   Component Value Date    CREATININE 1 20 (H) 09/15/2020    CREATININE 1 11 (H) 05/05/2020    CREATININE 1 13 (H) 12/16/2019    patient continues to follow-up Nephrology  Creatinine is elevated from previous readings    Again patient is trying to adjust her fluid balance so that she is not fluid overloaded and a stays out of congestive heart failure but getting adequate hydration

## 2021-06-08 NOTE — ASSESSMENT & PLAN NOTE
Wt Readings from Last 3 Encounters:   06/08/21 73 5 kg (162 lb)   03/08/21 74 8 kg (165 lb)   01/03/20 78 9 kg (174 lb)      patient continues to follow-up cardiology  She states that when she drinks too much water fluids during the day she does have some slight shortness breath when she lays down at nighttime  She is trying to gauge the proper amount fluid not cause problems with the hydration or further problems with her renal function  At this point denies any increasing shortness no increasing swelling to feet and ankles

## 2021-06-08 NOTE — ASSESSMENT & PLAN NOTE
Chronic anemia with  No significant change in her hemoglobin    Patient continues to take her iron tablet daily we will check a hemoglobin with her next visit

## 2021-06-08 NOTE — ASSESSMENT & PLAN NOTE
Patient presents today walking with a walker  She does relate she cannot stand or ambulate for long period time  She further relates that this limits her independence  No new arthritic aches or pains

## 2021-06-08 NOTE — ASSESSMENT & PLAN NOTE
The patient will be due for repeat Medicare wellness visit when she returns to the office in 4 months  Patient was given a slip for labs to be performed prior to the visit  She will continue with present medication and surveillance

## 2021-06-15 DIAGNOSIS — I50.41 ACUTE COMBINED SYSTOLIC AND DIASTOLIC CONGESTIVE HEART FAILURE (HCC): ICD-10-CM

## 2021-06-15 DIAGNOSIS — I25.10 ARTERIOSCLEROTIC CARDIOVASCULAR DISEASE: ICD-10-CM

## 2021-06-15 RX ORDER — POTASSIUM CHLORIDE 20 MEQ/1
20 TABLET, EXTENDED RELEASE ORAL DAILY
Qty: 90 TABLET | Refills: 3 | Status: SHIPPED | OUTPATIENT
Start: 2021-06-15

## 2021-09-20 DIAGNOSIS — F41.9 ANXIETY: ICD-10-CM

## 2021-09-20 RX ORDER — CITALOPRAM 10 MG/1
10 TABLET ORAL DAILY
Qty: 30 TABLET | Refills: 5 | Status: SHIPPED | OUTPATIENT
Start: 2021-09-20 | End: 2022-03-07 | Stop reason: SDUPTHER

## 2021-10-12 ENCOUNTER — RA CDI HCC (OUTPATIENT)
Dept: OTHER | Facility: HOSPITAL | Age: 86
End: 2021-10-12

## 2021-10-22 ENCOUNTER — OFFICE VISIT (OUTPATIENT)
Dept: INTERNAL MEDICINE CLINIC | Facility: CLINIC | Age: 86
End: 2021-10-22
Payer: COMMERCIAL

## 2021-10-22 VITALS
HEART RATE: 68 BPM | TEMPERATURE: 97.1 F | SYSTOLIC BLOOD PRESSURE: 122 MMHG | WEIGHT: 155 LBS | DIASTOLIC BLOOD PRESSURE: 78 MMHG | BODY MASS INDEX: 24.91 KG/M2 | OXYGEN SATURATION: 96 % | HEIGHT: 66 IN

## 2021-10-22 DIAGNOSIS — I12.9 BENIGN HYPERTENSION WITH CHRONIC KIDNEY DISEASE, STAGE III (HCC): Primary | ICD-10-CM

## 2021-10-22 DIAGNOSIS — N18.30 BENIGN HYPERTENSION WITH CHRONIC KIDNEY DISEASE, STAGE III (HCC): Primary | ICD-10-CM

## 2021-10-22 DIAGNOSIS — I50.41 ACUTE COMBINED SYSTOLIC AND DIASTOLIC CONGESTIVE HEART FAILURE (HCC): ICD-10-CM

## 2021-10-22 DIAGNOSIS — M79.89 RIGHT LEG SWELLING: ICD-10-CM

## 2021-10-22 PROCEDURE — 1160F RVW MEDS BY RX/DR IN RCRD: CPT | Performed by: NURSE PRACTITIONER

## 2021-10-22 PROCEDURE — 1036F TOBACCO NON-USER: CPT | Performed by: NURSE PRACTITIONER

## 2021-10-22 PROCEDURE — 99214 OFFICE O/P EST MOD 30 MIN: CPT | Performed by: NURSE PRACTITIONER

## 2021-11-01 ENCOUNTER — OFFICE VISIT (OUTPATIENT)
Dept: INTERNAL MEDICINE CLINIC | Facility: CLINIC | Age: 86
End: 2021-11-01
Payer: COMMERCIAL

## 2021-11-01 VITALS
WEIGHT: 158 LBS | SYSTOLIC BLOOD PRESSURE: 128 MMHG | OXYGEN SATURATION: 96 % | HEART RATE: 64 BPM | TEMPERATURE: 97 F | HEIGHT: 66 IN | DIASTOLIC BLOOD PRESSURE: 82 MMHG | BODY MASS INDEX: 25.39 KG/M2

## 2021-11-01 DIAGNOSIS — D63.1 ANEMIA DUE TO STAGE 3A CHRONIC KIDNEY DISEASE (HCC): ICD-10-CM

## 2021-11-01 DIAGNOSIS — E66.3 OVERWEIGHT (BMI 25.0-29.9): ICD-10-CM

## 2021-11-01 DIAGNOSIS — I25.10 ARTERIOSCLEROTIC CARDIOVASCULAR DISEASE: ICD-10-CM

## 2021-11-01 DIAGNOSIS — Z00.00 MEDICARE ANNUAL WELLNESS VISIT, SUBSEQUENT: Primary | ICD-10-CM

## 2021-11-01 DIAGNOSIS — N18.30 BENIGN HYPERTENSION WITH CHRONIC KIDNEY DISEASE, STAGE III (HCC): ICD-10-CM

## 2021-11-01 DIAGNOSIS — N18.31 ANEMIA DUE TO STAGE 3A CHRONIC KIDNEY DISEASE (HCC): ICD-10-CM

## 2021-11-01 DIAGNOSIS — E78.00 PURE HYPERCHOLESTEROLEMIA: ICD-10-CM

## 2021-11-01 DIAGNOSIS — I12.9 BENIGN HYPERTENSION WITH CHRONIC KIDNEY DISEASE, STAGE III (HCC): ICD-10-CM

## 2021-11-01 DIAGNOSIS — R26.2 AMBULATORY DYSFUNCTION: ICD-10-CM

## 2021-11-01 DIAGNOSIS — I50.41 ACUTE COMBINED SYSTOLIC AND DIASTOLIC CONGESTIVE HEART FAILURE (HCC): ICD-10-CM

## 2021-11-01 LAB
ALBUMIN SERPL-MCNC: 3.7 G/DL (ref 3.6–5.1)
ALBUMIN/GLOB SERPL: 1.5 (CALC) (ref 1–2.5)
ALP SERPL-CCNC: 69 U/L (ref 37–153)
ALT SERPL-CCNC: 15 U/L (ref 6–29)
AST SERPL-CCNC: 27 U/L (ref 10–35)
BASOPHILS # BLD AUTO: 41 CELLS/UL (ref 0–200)
BASOPHILS NFR BLD AUTO: 1.2 %
BILIRUB SERPL-MCNC: 0.6 MG/DL (ref 0.2–1.2)
BUN SERPL-MCNC: 14 MG/DL (ref 7–25)
BUN/CREAT SERPL: 12 (CALC) (ref 6–22)
CALCIUM SERPL-MCNC: 9.7 MG/DL (ref 8.6–10.4)
CHLORIDE SERPL-SCNC: 106 MMOL/L (ref 98–110)
CHOLEST SERPL-MCNC: 155 MG/DL
CHOLEST/HDLC SERPL: 3.4 (CALC)
CO2 SERPL-SCNC: 31 MMOL/L (ref 20–32)
CREAT SERPL-MCNC: 1.16 MG/DL (ref 0.6–0.88)
EOSINOPHIL # BLD AUTO: 139 CELLS/UL (ref 15–500)
EOSINOPHIL NFR BLD AUTO: 4.1 %
ERYTHROCYTE [DISTWIDTH] IN BLOOD BY AUTOMATED COUNT: 11.8 % (ref 11–15)
GLOBULIN SER CALC-MCNC: 2.5 G/DL (CALC) (ref 1.9–3.7)
GLUCOSE SERPL-MCNC: 90 MG/DL (ref 65–99)
HCT VFR BLD AUTO: 37.6 % (ref 35–45)
HDLC SERPL-MCNC: 45 MG/DL
HGB BLD-MCNC: 12 G/DL (ref 11.7–15.5)
LDLC SERPL CALC-MCNC: 93 MG/DL (CALC)
LYMPHOCYTES # BLD AUTO: 874 CELLS/UL (ref 850–3900)
LYMPHOCYTES NFR BLD AUTO: 25.7 %
MCH RBC QN AUTO: 31.2 PG (ref 27–33)
MCHC RBC AUTO-ENTMCNC: 31.9 G/DL (ref 32–36)
MCV RBC AUTO: 97.7 FL (ref 80–100)
MONOCYTES # BLD AUTO: 422 CELLS/UL (ref 200–950)
MONOCYTES NFR BLD AUTO: 12.4 %
NEUTROPHILS # BLD AUTO: 1924 CELLS/UL (ref 1500–7800)
NEUTROPHILS NFR BLD AUTO: 56.6 %
NONHDLC SERPL-MCNC: 110 MG/DL (CALC)
PLATELET # BLD AUTO: 159 THOUSAND/UL (ref 140–400)
PMV BLD REES-ECKER: 11.1 FL (ref 7.5–12.5)
POTASSIUM SERPL-SCNC: 3.7 MMOL/L (ref 3.5–5.3)
PROT SERPL-MCNC: 6.2 G/DL (ref 6.1–8.1)
RBC # BLD AUTO: 3.85 MILLION/UL (ref 3.8–5.1)
SL AMB EGFR AFRICAN AMERICAN: 47 ML/MIN/1.73M2
SL AMB EGFR NON AFRICAN AMERICAN: 41 ML/MIN/1.73M2
SODIUM SERPL-SCNC: 142 MMOL/L (ref 135–146)
TRIGL SERPL-MCNC: 77 MG/DL
WBC # BLD AUTO: 3.4 THOUSAND/UL (ref 3.8–10.8)

## 2021-11-01 PROCEDURE — 1170F FXNL STATUS ASSESSED: CPT | Performed by: INTERNAL MEDICINE

## 2021-11-01 PROCEDURE — 1125F AMNT PAIN NOTED PAIN PRSNT: CPT | Performed by: INTERNAL MEDICINE

## 2021-11-01 PROCEDURE — 3288F FALL RISK ASSESSMENT DOCD: CPT | Performed by: INTERNAL MEDICINE

## 2021-11-01 PROCEDURE — 1036F TOBACCO NON-USER: CPT | Performed by: INTERNAL MEDICINE

## 2021-11-01 PROCEDURE — 3725F SCREEN DEPRESSION PERFORMED: CPT | Performed by: INTERNAL MEDICINE

## 2021-11-01 PROCEDURE — 1160F RVW MEDS BY RX/DR IN RCRD: CPT | Performed by: INTERNAL MEDICINE

## 2021-11-01 PROCEDURE — T1015 CLINIC SERVICE: HCPCS | Performed by: INTERNAL MEDICINE

## 2021-11-01 PROCEDURE — G0439 PPPS, SUBSEQ VISIT: HCPCS | Performed by: INTERNAL MEDICINE

## 2021-11-02 LAB
APPEARANCE UR: ABNORMAL
BACTERIA UR QL AUTO: ABNORMAL /HPF
BILIRUB UR QL STRIP: NEGATIVE
CAOX CRY #/AREA URNS HPF: ABNORMAL /HPF
COLOR UR: ABNORMAL
GLUCOSE UR QL STRIP: NEGATIVE
HGB UR QL STRIP: NEGATIVE
HYALINE CASTS #/AREA URNS LPF: ABNORMAL /LPF
KETONES UR QL STRIP: ABNORMAL
LEUKOCYTE ESTERASE UR QL STRIP: ABNORMAL
NITRITE UR QL STRIP: NEGATIVE
PH UR STRIP: 5.5 [PH] (ref 5–8)
PROT UR QL STRIP: ABNORMAL
RBC #/AREA URNS HPF: ABNORMAL /HPF
SP GR UR STRIP: 1.02 (ref 1–1.03)
SQUAMOUS #/AREA URNS HPF: ABNORMAL /HPF
WBC #/AREA URNS HPF: ABNORMAL /HPF

## 2021-11-22 DIAGNOSIS — D50.8 IRON DEFICIENCY ANEMIA SECONDARY TO INADEQUATE DIETARY IRON INTAKE: ICD-10-CM

## 2021-11-22 RX ORDER — FERROUS SULFATE 325(65) MG
1 TABLET ORAL DAILY
Qty: 30 TABLET | Refills: 5 | Status: SHIPPED | OUTPATIENT
Start: 2021-11-22

## 2021-12-06 DIAGNOSIS — R26.2 AMBULATORY DYSFUNCTION: Primary | ICD-10-CM

## 2021-12-09 ENCOUNTER — TELEPHONE (OUTPATIENT)
Dept: INTERNAL MEDICINE CLINIC | Facility: CLINIC | Age: 86
End: 2021-12-09

## 2021-12-10 ENCOUNTER — TELEPHONE (OUTPATIENT)
Dept: INTERNAL MEDICINE CLINIC | Facility: CLINIC | Age: 86
End: 2021-12-10

## 2021-12-10 DIAGNOSIS — I50.41 ACUTE COMBINED SYSTOLIC AND DIASTOLIC CONGESTIVE HEART FAILURE (HCC): Primary | ICD-10-CM

## 2021-12-10 DIAGNOSIS — R26.2 AMBULATORY DYSFUNCTION: ICD-10-CM

## 2021-12-16 ENCOUNTER — TELEPHONE (OUTPATIENT)
Dept: INTERNAL MEDICINE CLINIC | Facility: CLINIC | Age: 86
End: 2021-12-16

## 2021-12-16 DIAGNOSIS — I25.10 ARTERIOSCLEROTIC CARDIOVASCULAR DISEASE: ICD-10-CM

## 2021-12-16 DIAGNOSIS — R26.2 AMBULATORY DYSFUNCTION: Primary | ICD-10-CM

## 2021-12-17 ENCOUNTER — TELEPHONE (OUTPATIENT)
Dept: INTERNAL MEDICINE CLINIC | Facility: CLINIC | Age: 86
End: 2021-12-17

## 2022-01-05 ENCOUNTER — RA CDI HCC (OUTPATIENT)
Dept: OTHER | Facility: HOSPITAL | Age: 87
End: 2022-01-05

## 2022-01-05 NOTE — PROGRESS NOTES
The following dx found on active problem list - please assess using MEAT for 2022 billing    Benign hypertension with chronic kidney disease, stage III ([facility] ) [I12 9, N18 30]    Paroxysmal atrial fibrillation ([facility] ) [I48 0]    Acute combined systolic and diastolic congestive heart failure ([facility] ) [I50 41]    [facility]  coding opportunities          Number of diagnosis code(s) already on the problem list added to FYI flag: 3                     Patients insurance company: [employer])             [place] [address]  coding opportunities          Number of diagnosis code(s) already on the problem list added to [employer] flag: 3                  Number of suggestions NOT actually used: 3     Patients insurance company: [employer])     Visit status: Patient canceled the appointment

## 2022-01-06 ENCOUNTER — TELEMEDICINE (OUTPATIENT)
Dept: INTERNAL MEDICINE CLINIC | Facility: CLINIC | Age: 87
End: 2022-01-06

## 2022-01-06 ENCOUNTER — TELEPHONE (OUTPATIENT)
Dept: INTERNAL MEDICINE CLINIC | Facility: CLINIC | Age: 87
End: 2022-01-06

## 2022-01-06 DIAGNOSIS — R26.2 AMBULATORY DYSFUNCTION: Primary | ICD-10-CM

## 2022-01-06 DIAGNOSIS — N18.32 ANEMIA DUE TO STAGE 3B CHRONIC KIDNEY DISEASE (HCC): ICD-10-CM

## 2022-01-06 DIAGNOSIS — I50.41 ACUTE COMBINED SYSTOLIC AND DIASTOLIC CONGESTIVE HEART FAILURE (HCC): ICD-10-CM

## 2022-01-06 DIAGNOSIS — I25.10 ARTERIOSCLEROTIC CARDIOVASCULAR DISEASE: ICD-10-CM

## 2022-01-06 DIAGNOSIS — D63.1 ANEMIA DUE TO STAGE 3B CHRONIC KIDNEY DISEASE (HCC): ICD-10-CM

## 2022-01-06 PROCEDURE — 1036F TOBACCO NON-USER: CPT | Performed by: INTERNAL MEDICINE

## 2022-01-06 PROCEDURE — 99214 OFFICE O/P EST MOD 30 MIN: CPT | Performed by: INTERNAL MEDICINE

## 2022-01-06 PROCEDURE — 1160F RVW MEDS BY RX/DR IN RCRD: CPT | Performed by: INTERNAL MEDICINE

## 2022-01-06 NOTE — ASSESSMENT & PLAN NOTE
We continue to monitor her hemoglobin which is stable along with her renal function which is shown slight deterioration  Patient continues with diuretics as per Cardiology

## 2022-01-06 NOTE — ASSESSMENT & PLAN NOTE
Patient is continuing to get physical therapy at home  Occupational therapy was stopped today with the patient her goals  Patient is still having difficulty getting out of bedroom getting down the stairs  Her daughter make sure she gets proper nutrition  Patient is unable to participate in the exam today because of problems with her hearing so most of discussion today was with the daughter  They are hoping to get the patient more functional so that she can get out of her room get down the stairs and hopefully then be able to get out of the house

## 2022-01-06 NOTE — PROGRESS NOTES
Virtual Regular Visit    Verification of patient location:    Patient is located in the following state in which I hold an active license [place]      Assessment/Plan:    Problem List Items Addressed This Visit        Cardiovascular and Mediastinum    Arteriosclerotic cardiovascular disease     Continues to follow-up with cardiology  Denies any chest pain or pressure and no increasing shortness of breath exertion  Acute combined systolic and diastolic congestive heart failure (HCC)     Wt Readings from Last 3 Encounters:   11/01/21 71 7 kg (158 lb)   10/22/21 70 3 kg (155 lb)   06/08/21 73 5 kg (162 lb)     Patient does have a history of chronic combined systolic and diastolic congestive heart failure  She remains on diuretic 20 mg daily  She states that she occasionally has some shortness of breath and I did discuss with the daughter today the importance of keeping an eye on her lower extremities and if needed to increase her dosage for a day of the Lasix from 20-40 mg daily  To continue to monitor her respiratory status  She has had her O2 sat checked by her physical therapist and this is stable at 98%                Other    Ambulatory dysfunction - Primary     Patient is continuing to get physical therapy at home  Occupational therapy was stopped today with the patient her goals  Patient is still having difficulty getting out of bedroom getting down the stairs  Her daughter make sure she gets proper nutrition  Patient is unable to participate in the exam today because of problems with her hearing so most of discussion today was with the daughter  They are hoping to get the patient more functional so that she can get out of her room get down the stairs and hopefully then be able to get out of the house  Anemia due to chronic kidney disease     We continue to monitor her hemoglobin which is stable along with her renal function which is shown slight deterioration    Patient continues with diuretics as per Cardiology  Reason for visit is No chief complaint on file  Encounter provider Aileen Machado DO    Provider located at [address] [address]      Recent Visits  No visits were found meeting these conditions  Showing recent visits within past 7 days and meeting all other requirements  Today's Visits  Date Type Provider Dept   01/06/22 Telephone Milly Mosqueda [facility] Internal Med   01/06/22 Telemedicine Aileen Machado, [address] Internal Med   Showing today's visits and meeting all other requirements  Future Appointments  No visits were found meeting these conditions  Showing future appointments within next 150 days and meeting all other requirements       The patient was identified by name and date of birth  Lorenzo West was informed that this is a telemedicine visit and that the visit is being conducted through [address] [facility] and patient was informed that this is a secure, HIPAA-compliant platform  She agrees to proceed     My office door was closed  No one else was in the room  She acknowledged consent and understanding of privacy and security of the video platform  The patient has agreed to participate and understands they can discontinue the visit at any time  Patient is aware this is a billable service  Subjective  Lorenzo West is a 80 y o  female follow-up after 2 week period of time, re-evaluation after receiving physical therapy, occupational therapy   59-year-old female history of extensive medical problems outlined previously who is being interviewed today for follow-up after recent fall at home difficulties with ambulation  We continue with physical therapy, occupational therapy  Only complaint today from the patient is her frustration with being isolated in her room at home a not being able to ambulate and is deconditioned    She also complains of some occasional shortness of breath with exertion       Past Medical History:   Diagnosis Date    Heart disease     High cholesterol     Hypertension        Past Surgical History:   Procedure Laterality Date    APPENDECTOMY      CARDIAC PACEMAKER PLACEMENT      CARDIAC SURGERY      NECK SURGERY      REDUCTION MAMMAPLASTY Bilateral     REPLACEMENT TOTAL KNEE         Current Outpatient Medications   Medication Sig Dispense Refill    amLODIPine (NORVASC) 5 mg tablet Take 1 tablet (5 mg total) by mouth daily (Patient not taking: Reported on 6/8/2021) 90 tablet 3    apixaban (ELIQUIS) 2 5 mg TAKE 1 TABLET BY MOUTH 2 TIMES A DAY      aspirin (ECOTRIN LOW STRENGTH) 81 mg EC tablet Take 81 mg by mouth daily      atenolol (TENORMIN) 50 mg tablet Take 1 tablet (50 mg total) by mouth daily 90 tablet 3    atorvastatin (LIPITOR) 10 mg tablet Take 1 tablet (10 mg total) by mouth daily 90 tablet 3    bimatoprost (LUMIGAN) 0 01 % ophthalmic drops 1 drop in both eyes at bedtime      cholecalciferol (VITAMIN D3) 1,000 units tablet Take 2,000 Units by mouth      citalopram (CeleXA) 10 mg tablet Take 1 tablet (10 mg total) by mouth daily 30 tablet 5    cyanocobalamin 1000 MCG tablet Take 1,000 mcg by mouth      dorzolamide-timolol (COSOPT) 22 3-6 8 MG/ML ophthalmic solution instill 1 drop into both eyes twice a day 10 mL 0    ferrous sulfate (RA Iron) 325 (65 Fe) mg tablet Take 1 tablet (325 mg total) by mouth daily 30 tablet 5    folic acid (FOLVITE) 1 mg tablet Take 1,000 mcg by mouth daily      furosemide (Lasix) 20 mg tablet Take 1 tablet (20 mg total) by mouth daily 90 tablet 2    Misc  Devices (TRANSPORT CHAIR) MISC by Does not apply route as needed (Lower extremity weakness) Patient does have a history of degenerative arthritis and she does have problems with lower extremity weakness, via ambulatory dysfunction    Patient also has a history of coronary artery disease and is not able 1 each 0    omeprazole (PriLOSEC) 20 mg delayed release capsule Take 20 mg by mouth daily      potassium chloride (K-DUR,KLOR-CON) 20 mEq tablet Take 1 tablet (20 mEq total) by mouth daily 90 tablet 3     No current facility-administered medications for this visit  No Known Allergies    Review of Systems   Constitutional: Positive for activity change (Restricted in activity level  Sequestered in the upstairs of the house  Unable to get out of the house  Having physical therapy and occupational therapy at home) and fatigue ( the admits to some fatigability especially after vigorous physical therapy session)  Negative for appetite change, chills, diaphoresis, fever and unexpected weight change  HENT: Positive for hearing loss ( chronic hearing loss does not have her hearing aids in place today)  Negative for congestion, dental problem, drooling, ear discharge, ear pain, facial swelling, mouth sores, nosebleeds, postnasal drip, rhinorrhea, sinus pressure, sinus pain, sneezing, sore throat, tinnitus, trouble swallowing and voice change  Eyes: Negative  Respiratory: Positive for shortness of breath (Some shortness of breath with brisk exertion)  Negative for apnea, cough, choking, chest tightness and stridor  Cardiovascular: Positive for leg swelling ( the episodic trace edema to lower extremities)  Negative for chest pain and palpitations  Gastrointestinal: Negative  Endocrine: Negative  Genitourinary: Negative  Complains of problems increased urination when she takes an extra Lasix dose   Musculoskeletal: Positive for arthralgias, back pain and gait problem  Negative for joint swelling, myalgias, neck pain and neck stiffness  Skin: Negative  Allergic/Immunologic: Negative  Neurological: Positive for weakness ( the weakness to lower extremities which is improving with physical therapy)   Negative for dizziness, tremors, seizures, syncope, facial asymmetry, speech difficulty, light-headedness, numbness and headaches  Hematological: Negative  Psychiatric/Behavioral: Negative  Video Exam    There were no vitals filed for this visit  Physical Exam  Vitals and nursing note reviewed  Constitutional:       General: She is not in acute distress  Appearance: Normal appearance  She is not ill-appearing, toxic-appearing or diaphoretic  Comments: Pleasant a hard of hearing 80-year-old female who is awake alert  Difficulty with conversation unable to hear questioning the but is accompanied by her daughter who has helped with interpretation patient is laying in bed is awake alert   HENT:      Head: Normocephalic and atraumatic  Ears:      Comments: Significant decreased auditory acuity bilaterally  Pulmonary:      Comments: No respiratory compromise noted during conversation  Neurological:      Mental Status: She is alert and oriented to person, place, and time  Mental status is at baseline  Psychiatric:         Mood and Affect: Mood normal          Behavior: Behavior normal          Thought Content: Thought content normal          Judgment: Judgment normal           I spent 25 minutes directly with the patient during this visit    VIRTUAL VISIT Anselmo Yaquelin 42 verbally agrees to participate in [employer]  Pt is aware that [employer] could be limited without vital signs or the ability to perform a full hands-on physical Samdevi Beatty understands she or the provider may request at any time to terminate the video visit and request the patient to seek care or treatment in person

## 2022-01-06 NOTE — ASSESSMENT & PLAN NOTE
Wt Readings from Last 3 Encounters:   11/01/21 71 7 kg (158 lb)   10/22/21 70 3 kg (155 lb)   06/08/21 73 5 kg (162 lb)     Patient does have a history of chronic combined systolic and diastolic congestive heart failure  She remains on diuretic 20 mg daily  She states that she occasionally has some shortness of breath and I did discuss with the daughter today the importance of keeping an eye on her lower extremities and if needed to increase her dosage for a day of the Lasix from 20-40 mg daily  To continue to monitor her respiratory status    She has had her O2 sat checked by her physical therapist and this is stable at 98%

## 2022-01-06 NOTE — ASSESSMENT & PLAN NOTE
Continues to follow-up with cardiology  Denies any chest pain or pressure and no increasing shortness of breath exertion

## 2022-01-06 NOTE — TELEPHONE ENCOUNTER
Beka from OT called  During her session today, she was in a stance and started acting strange  Not answering questions,unaware of things going on around her, SOB  He sat her down and it resolved

## 2022-01-14 ENCOUNTER — TELEPHONE (OUTPATIENT)
Dept: INTERNAL MEDICINE CLINIC | Facility: CLINIC | Age: 87
End: 2022-01-14

## 2022-01-14 NOTE — TELEPHONE ENCOUNTER
Kaylee Lanier called and is looking for a certificate form for patient   Maribel Cast can be reached at [phone]

## 2022-02-01 ENCOUNTER — NURSING HOME VISIT (OUTPATIENT)
Dept: WOUND CARE | Facility: HOSPITAL | Age: 87
End: 2022-02-01
Payer: COMMERCIAL

## 2022-02-01 DIAGNOSIS — L89.150 PRESSURE INJURY OF SACRAL REGION, UNSTAGEABLE (HCC): Primary | ICD-10-CM

## 2022-02-01 DIAGNOSIS — R26.2 AMBULATORY DYSFUNCTION: ICD-10-CM

## 2022-02-01 PROCEDURE — 99305 1ST NF CARE MODERATE MDM 35: CPT | Performed by: NURSE PRACTITIONER

## 2022-02-01 NOTE — ASSESSMENT & PLAN NOTE
Sacrum  - as per medical Record review, the wound was discovered on January 31, 2022 as a stage II pressure ulcer  Current treatment is hydraguard  - as per report, the patient refuse to use wheelchair and prefer to seat on the recliner chair      Assessment  - wound size is 0 5 x 0 4 x 0 1 cm , 100% slough, deepest tissue is not visible due to present of devitalize tissue, no drainage, no obvious sign of infection  - patient is on air mattress, on pressure ulcer prevention protocol in the facility  - ambulatory with walker, Continence  of both bowel in bladder, intact sensation    Plan  - the goal is to heal the wound  - change local wound care treatment to Medihoney for autolytic debridement and border foam to decrease pressure from seating on the recliner chair  - staff to place the Roho cushion on the recliner chair  - patient is an on ensure plus, vitamin-D and vitamin-B 12  - continued offload using on air mattress  - followup next week

## 2022-02-01 NOTE — LETTER
Patient:  Ruby Vasquez   1/27/1928           ELMER Moreno saw Ruby Vasquez for a wound care visit on 2/1/2022  See below for information relating to this visit  Chief Complaint   Patient presents with    New Patient Visit     Sacrum        Assessment/Plan:  1  Pressure injury of sacral region, unstageable [facility])  Assessment & Plan:  Sacrum  - as per medical Record review, the wound was discovered on January 31, 2022 as a stage II pressure ulcer  Current treatment is hydraguard  - as per report, the patient refuse to use wheelchair and prefer to seat on the recliner chair  Assessment  - wound size is 0 5 x 0 4 x 0 1 cm , 100% slough, deepest tissue is not visible due to present of devitalize tissue, no drainage, no obvious sign of infection  - patient is on air mattress, on pressure ulcer prevention protocol in the facility  - ambulatory with walker, Continence  of both bowel in bladder, intact sensation    Plan  - the goal is to heal the wound  - change local wound care treatment to Medihoney for autolytic debridement and border foam to decrease pressure from seating on the recliner chair  - staff to place the Roho cushion on the recliner chair  - patient is an on ensure plus, vitamin-D and vitamin-B 12  - continued offload using on air mattress  - followup next week    Orders:  -     Wound cleansing and dressings; Future    2   Ambulatory dysfunction  Assessment & Plan:  On STR             Orders:  Ruby Vasquez  1/27/1928  Orders Placed This Encounter   Procedures    Wound cleansing and dressings     Wound:  Sacrum  Discontinue previous wound order  Cleanse the wound bed with soap and water, pat dry  Apply medihoney gel to wound bed then cover with bordered foam  Frequency : daily and prn for soiling  Offload all wounds  Turn and reposition frequently, maximum of every two hours  Instruct / Assist with weight shifting every 15 - 20 minutes when in chair  Increase protein intake  Monitor for any sign of infection or worsening, inform PCP or patient's primary physician in your facility  Standing Status:   Future     Standing Expiration Date:   2/1/2023         Follow Up:  Return in about 1 week (around 2/8/2022)  Ricardo Hart hours are 8:00 am - 4:30 pm Monday through Friday  The center phone number is [phone]  You can also contact me directly thru my email at [facility]  [email or web address]  [email or web address] or thru tiger text  If it is an emergency, please contact the PCP or patient's attending physician in your facility       Sincerely,    Electronically signed by ELMER Mims    Patient : Erika Garcia    1/27/1928

## 2022-02-01 NOTE — PATIENT INSTRUCTIONS
Orders Placed This Encounter   Procedures    Wound cleansing and dressings     Wound:  Sacrum  Discontinue previous wound order  Cleanse the wound bed with soap and water, pat dry  Apply medihoney gel to wound bed then cover with bordered foam  Frequency : daily and prn for soiling  Offload all wounds  Turn and reposition frequently, maximum of every two hours  Instruct / Assist with weight shifting every 15 - 20 minutes when in chair  Increase protein intake  Monitor for any sign of infection or worsening, inform PCP or patient's primary physician in your facility       Standing Status:   Future     Standing Expiration Date:   2/1/2023

## 2022-02-01 NOTE — LETTER
Patient:  Cruz Prince   1/27/1928           ELMER Maxwell saw Cruz Prince for a wound care visit on 2/1/2022  See below for information relating to this visit  Chief Complaint   Patient presents with    New Patient Visit     Sacrum        Assessment/Plan:  1  Pressure injury of sacral region, unstageable [facility])  Assessment & Plan:  Sacrum  - as per medical Record review, the wound was discovered on January 31, 2022 as a stage II pressure ulcer  Current treatment is hydraguard  - as per report, the patient refuse to use wheelchair and prefer to seat on the recliner chair  Assessment  - wound size is 0 5 x 0 4 x 0 1 cm , 100% slough, deepest tissue is not visible due to present of devitalize tissue, no drainage, no obvious sign of infection  - patient is on air mattress, on pressure ulcer prevention protocol in the facility  - ambulatory with walker, Continence  of both bowel in bladder, intact sensation    Plan  - the goal is to heal the wound  - change local wound care treatment to Medihoney for autolytic debridement and border foam to decrease pressure from seating on the recliner chair  - staff to place the Roho cushion on the recliner chair  - patient is an on ensure plus, vitamin-D and vitamin-B 12  - continued offload using on air mattress  - followup next week    Orders:  -     Wound cleansing and dressings; Future    2   Ambulatory dysfunction  Assessment & Plan:  On STR             Orders:  Cruz Prince  1/27/1928  Orders Placed This Encounter   Procedures    Wound cleansing and dressings     Wound:  Sacrum  Discontinue previous wound order  Cleanse the wound bed with soap and water, pat dry  Apply medihoney gel to wound bed then cover with bordered foam  Frequency : daily and prn for soiling  Offload all wounds  Turn and reposition frequently, maximum of every two hours  Instruct / Assist with weight shifting every 15 - 20 minutes when in chair  Increase protein intake  Monitor for any sign of infection or worsening, inform PCP or patient's primary physician in your facility  Standing Status:   Future     Standing Expiration Date:   2/1/2023         Follow Up:  Return in about 1 week (around 2/8/2022)  Ricardo Hart hours are 8:00 am - 4:30 pm Monday through Friday  The center phone number is [phone]  You can also contact me directly thru my email at [facility]  [email or web address]  [email or web address] or thru tiger text  If it is an emergency, please contact the PCP or patient's attending physician in your facility       Sincerely,    Electronically signed by ELMER Faria    Patient : Lorenzo West    1/27/1928

## 2022-02-02 DIAGNOSIS — I25.10 ARTERIOSCLEROTIC CARDIOVASCULAR DISEASE: ICD-10-CM

## 2022-02-02 DIAGNOSIS — I50.41 ACUTE COMBINED SYSTOLIC AND DIASTOLIC CONGESTIVE HEART FAILURE (HCC): ICD-10-CM

## 2022-02-02 RX ORDER — FUROSEMIDE 20 MG/1
20 TABLET ORAL DAILY
Qty: 90 TABLET | Refills: 2 | Status: SHIPPED | OUTPATIENT
Start: 2022-02-02

## 2022-02-02 NOTE — PROGRESS NOTES
[address] MANAGEMENT   [facility]       Patient ID: Fadi Montague is a 80 y o  female Date of Birth 1/27/1928     Location of Service: [facility]    Performed wound round with:  Wound team      Chief Complaint   Patient presents with    New Patient Visit     Sacrum       Wound Instructions:  Orders Placed This Encounter   Procedures    Wound cleansing and dressings     Wound:  Sacrum  Discontinue previous wound order  Cleanse the wound bed with soap and water, pat dry  Apply medihoney gel to wound bed then cover with bordered foam  Frequency : daily and prn for soiling  Offload all wounds  Turn and reposition frequently, maximum of every two hours  Instruct / Assist with weight shifting every 15 - 20 minutes when in chair  Increase protein intake  Monitor for any sign of infection or worsening, inform PCP or patient's primary physician in your facility  Standing Status:   Future     Standing Expiration Date:   2/1/2023       Allergies  Patient has no known allergies  Assessment & Plan:  1  Pressure injury of sacral region, unstageable [facility])  Assessment & Plan:  Sacrum  - as per medical Record review, the wound was discovered on January 31, 2022 as a stage II pressure ulcer  Current treatment is hydraguard  - as per report, the patient refuse to use wheelchair and prefer to seat on the recliner chair      Assessment  - wound size is 0 5 x 0 4 x 0 1 cm , 100% slough, deepest tissue is not visible due to present of devitalize tissue, no drainage, no obvious sign of infection  - patient is on air mattress, on pressure ulcer prevention protocol in the facility  - ambulatory with walker, Continence  of both bowel in bladder, intact sensation    Plan  - the goal is to heal the wound  - change local wound care treatment to Medihoney for autolytic debridement and border foam to decrease pressure from seating on the recliner chair  - staff to place the Roho cushion on the recliner chair  - patient is an on ensure plus, vitamin-D and vitamin-B 12  - continued offload using on air mattress  - followup next week    Orders:  -     Wound cleansing and dressings; Future    2  Ambulatory dysfunction  Assessment & Plan:  On STR             Subjective: This is a 66-year-old female referred to our service for wound on the sacrum  Patient have a complex medical history including but not limited to coronary artery disease bypass surgery in the past, hypertension, hyperlipidemia, chronic kidney disease, chronic  Anemia, DJD with ambulatory dysfunction  Patient is seen in coordination with the wound team of the facility  Received patient in bed, seems comfortable  As per report, the wound started on January 27, 2022  Possible etiology is pressure ulcer  Current treatment is hydraguard  Patient is containment of both bowel and bladder  She can ambulate using a walker  Patient denies pain  As per report, patient sits on the recliner chair  Review of Systems   Constitutional: Negative  HENT: Negative  Eyes: Negative  Respiratory: Negative  Cardiovascular: Negative  Negative for leg swelling  With pacemaker   Gastrointestinal: Negative  Endocrine: Negative  Genitourinary: Negative  Musculoskeletal: Positive for gait problem  Skin: Positive for wound  See HPI   Neurological: Negative for dizziness and headaches  Psychiatric/Behavioral: Negative for behavioral problems  Objective: There were no vitals taken for this visit  Physical Exam  Constitutional:       Appearance: Normal appearance  HENT:      Head: Normocephalic and atraumatic  Nose: Nose normal    Eyes:      Conjunctiva/sclera: Conjunctivae normal    Cardiovascular:      Rate and Rhythm: Normal rate  Pulmonary:      Effort: Pulmonary effort is normal    Abdominal:      Palpations: Abdomen is soft  Tenderness: There is no abdominal tenderness   There is no guarding  Genitourinary:     Comments: continent  Musculoskeletal:         General: No tenderness  Cervical back: Normal range of motion  Right lower leg: No edema  Left lower leg: No edema  Comments: LROM   Skin:     General: Skin is warm  Findings: Lesion present  Comments: Sacrum - 0 5 x 0 4 x 0 1 cm , 100% slough, no drainage, no obvious sign of infection, no undermining, no tunneling, no malodor, periwound is intact, wound edge is attached to wound base, denies pain   Neurological:      Mental Status: She is alert and oriented to person, place, and time  Psychiatric:         Mood and Affect: Mood normal          Behavior: Behavior normal               Procedures           Patient's care was coordinated with nursing facility staff  Recent vitals, labs and updated medications were reviewed on EMR or chart system of facility  Past Medical, surgical, social, medication and allergy history and patient's previous records were reviewed and updated as appropriate:     Patient Active Problem List   Diagnosis    Arteriosclerotic cardiovascular disease    Benign hypertension with chronic kidney disease, stage III ([facility] )    Hyperlipidemia    Paroxysmal atrial fibrillation ([facility] )    Rapid weight loss    Overweight (BMI 25 0-29  9)    Anxiety    Ambulatory dysfunction    Acute combined systolic and diastolic congestive heart failure ([facility] )    Medicare annual wellness visit, subsequent    Bronchitis    Anemia due to chronic kidney disease    Right leg swelling    Pressure injury of sacral region, unstageable [facility])     Past Medical History:   Diagnosis Date    Heart disease     High cholesterol     Hypertension      Past Surgical History:   Procedure Laterality Date    APPENDECTOMY      CARDIAC PACEMAKER PLACEMENT      CARDIAC SURGERY      NECK SURGERY      REDUCTION MAMMAPLASTY Bilateral     REPLACEMENT TOTAL KNEE       Social History     Socioeconomic History    Marital status:       Spouse name: None    Number of children: None    Years of education: None    Highest education level: None   Occupational History    None   Tobacco Use    Smoking status: Never Smoker    Smokeless tobacco: Never Used   Substance and Sexual Activity    Alcohol use: Not Currently     Comment: Social    Drug use: No    Sexual activity: None   Other Topics Concern    None   Social History Narrative    Denied: Caffeine use     Social Determinants of Health     Financial Resource Strain: Not on file   Food Insecurity: Not on file   Transportation Needs: Not on file   Physical Activity: Not on file   Stress: Not on file   Social Connections: Not on file   Intimate Partner Violence: Not on file   Housing Stability: Not on file        Current Outpatient Medications:     amLODIPine (NORVASC) 5 mg tablet, Take 1 tablet (5 mg total) by mouth daily (Patient not taking: Reported on 6/8/2021), Disp: 90 tablet, Rfl: 3    apixaban (ELIQUIS) 2 5 mg, TAKE 1 TABLET BY MOUTH 2 TIMES A DAY, Disp: , Rfl:     aspirin (ECOTRIN LOW STRENGTH) 81 mg EC tablet, Take 81 mg by mouth daily, Disp: , Rfl:     atenolol (TENORMIN) 50 mg tablet, Take 1 tablet (50 mg total) by mouth daily, Disp: 90 tablet, Rfl: 3    atorvastatin (LIPITOR) 10 mg tablet, Take 1 tablet (10 mg total) by mouth daily, Disp: 90 tablet, Rfl: 3    bimatoprost (LUMIGAN) 0 01 % ophthalmic drops, 1 drop in both eyes at bedtime, Disp: , Rfl:     cholecalciferol (VITAMIN D3) 1,000 units tablet, Take 2,000 Units by mouth, Disp: , Rfl:     citalopram (CeleXA) 10 mg tablet, Take 1 tablet (10 mg total) by mouth daily, Disp: 30 tablet, Rfl: 5    cyanocobalamin 1000 MCG tablet, Take 1,000 mcg by mouth, Disp: , Rfl:     dorzolamide-timolol (COSOPT) 22 3-6 8 MG/ML ophthalmic solution, instill 1 drop into both eyes twice a day, Disp: 10 mL, Rfl: 0    ferrous sulfate (RA Iron) 325 (65 Fe) mg tablet, Take 1 tablet (325 mg total) by mouth daily, Disp: 30 tablet, Rfl: 5    folic acid (FOLVITE) 1 mg tablet, Take 1,000 mcg by mouth daily, Disp: , Rfl:     furosemide (Lasix) 20 mg tablet, Take 1 tablet (20 mg total) by mouth daily, Disp: 90 tablet, Rfl: 2    Misc  Devices (TRANSPORT CHAIR) MISC, by Does not apply route as needed (Lower extremity weakness) Patient does have a history of degenerative arthritis and she does have problems with lower extremity weakness, via ambulatory dysfunction  Patient also has a history of coronary artery disease and is not able, Disp: 1 each, Rfl: 0    omeprazole (PriLOSEC) 20 mg delayed release capsule, Take 20 mg by mouth daily, Disp: , Rfl:     potassium chloride (K-DUR,KLOR-CON) 20 mEq tablet, Take 1 tablet (20 mEq total) by mouth daily, Disp: 90 tablet, Rfl: 3  Family History   Problem Relation Age of Onset    Other Father         Malignant neoplasm of brain    Aneurysm Brother         Abdominal aortic aneurysm repair for dilation or occlusion; Malignant neoplasm of brain              Coordination of Care:  Wound team aware of the treatment plan    Patient / Staff education : Patient / Staff was given education on sign of infection and pressure ulcer prevention  Patient/ Staff verbalized understanding     Follow up :  Return in about 1 week (around 2/8/2022)  Voice-recognition software may have been used in the preparation of this document  Occasional wrong word or "sound-alike" substitutions may have occurred due to the inherent limitations of voice recognition software  Interpretation should be guided by context        ELMER Porter

## 2022-02-04 ENCOUNTER — TELEPHONE (OUTPATIENT)
Dept: INTERNAL MEDICINE CLINIC | Facility: CLINIC | Age: 87
End: 2022-02-04

## 2022-02-04 ENCOUNTER — TRANSITIONAL CARE MANAGEMENT (OUTPATIENT)
Dept: INTERNAL MEDICINE CLINIC | Facility: CLINIC | Age: 87
End: 2022-02-04

## 2022-02-07 ENCOUNTER — TELEMEDICINE (OUTPATIENT)
Dept: INTERNAL MEDICINE CLINIC | Facility: CLINIC | Age: 87
End: 2022-02-07
Payer: COMMERCIAL

## 2022-02-07 DIAGNOSIS — M79.604 LEG PAIN, BILATERAL: Primary | ICD-10-CM

## 2022-02-07 DIAGNOSIS — D63.1 ANEMIA DUE TO STAGE 3B CHRONIC KIDNEY DISEASE (HCC): ICD-10-CM

## 2022-02-07 DIAGNOSIS — M79.605 LEG PAIN, BILATERAL: Primary | ICD-10-CM

## 2022-02-07 DIAGNOSIS — R26.2 AMBULATORY DYSFUNCTION: ICD-10-CM

## 2022-02-07 DIAGNOSIS — I50.41 ACUTE COMBINED SYSTOLIC AND DIASTOLIC CONGESTIVE HEART FAILURE (HCC): ICD-10-CM

## 2022-02-07 DIAGNOSIS — N18.32 ANEMIA DUE TO STAGE 3B CHRONIC KIDNEY DISEASE (HCC): ICD-10-CM

## 2022-02-07 PROCEDURE — 99495 TRANSJ CARE MGMT MOD F2F 14D: CPT | Performed by: INTERNAL MEDICINE

## 2022-02-07 RX ORDER — CELECOXIB 100 MG/1
100 CAPSULE ORAL 2 TIMES DAILY
COMMUNITY
Start: 2022-01-14

## 2022-02-07 RX ORDER — LIDOCAINE 4 G/G
1 PATCH TOPICAL DAILY
COMMUNITY
Start: 2022-01-15 | End: 2022-02-07 | Stop reason: SDUPTHER

## 2022-02-07 RX ORDER — LIDOCAINE 4 G/G
1 PATCH TOPICAL DAILY
Qty: 39 PATCH | Refills: 5 | Status: SHIPPED | OUTPATIENT
Start: 2022-02-07 | End: 2022-02-21 | Stop reason: SDUPTHER

## 2022-02-07 RX ORDER — METHOCARBAMOL 500 MG/1
TABLET, FILM COATED ORAL
COMMUNITY
Start: 2022-01-14

## 2022-02-07 RX ORDER — HAND SANITIZING WIPES 0.66 ML/ML
SWAB TOPICAL
COMMUNITY
Start: 2022-01-14

## 2022-02-07 NOTE — ASSESSMENT & PLAN NOTE
Because of increasing pain in both lower extremities she was initially hospitalized and then was in rehab  She was discharged from rehab on the 4th and now is at home  Patient apparently is having some problems with fluid overload  Still having pain in the legs but not as severe and she takes Tylenol which does help but not 100%  Also using a Lidoderm patch  She is receiving physical therapy at home and there also checking her blood pressure and oxygen counts  Patient hopefully will have some improvement with rehab    And physical therapy at home

## 2022-02-07 NOTE — ASSESSMENT & PLAN NOTE
Patient is scheduled for lab test the full form tomorrow hopefully checking on her CBC, basic metabolic profile the making sure her blood counts are stable  She is complaining of some shortness of breath with exertion and hopefully this is not secondary to profound anemia

## 2022-02-07 NOTE — ASSESSMENT & PLAN NOTE
Patient has a history of chronic paroxysmal atrial fibrillation  She is on oral anticoagulants  She is having labs performed tomorrow hopefully checking on a CBC and also we need to continue to monitor her renal function    Some shortness of breath exertion but apparently her heart rate is controlled unsure about whether not her rhythm is regular

## 2022-02-07 NOTE — ASSESSMENT & PLAN NOTE
Wt Readings from Last 3 Encounters:   11/01/21 71 7 kg (158 lb)   10/22/21 70 3 kg (155 lb)   06/08/21 73 5 kg (162 lb)     Patient's weight is still somewhat elevated from her baseline  Is having some problems with increased swelling to lower extremities and they have increased her Lasix dose temporarily  Continue to monitor her renal function and hopefully will have some improvement in lower extremity edema and shortness of breath with increased dose of Lasix

## 2022-02-07 NOTE — PROGRESS NOTES
Virtual TCM Visit:    Verification of patient location:    Patient is located in the following state in which I hold an active license [place]        Assessment/Plan:        Problem List Items Addressed This Visit        Cardiovascular and Mediastinum    Acute combined systolic and diastolic congestive heart failure (HCC)     Wt Readings from Last 3 Encounters:   11/01/21 71 7 kg (158 lb)   10/22/21 70 3 kg (155 lb)   06/08/21 73 5 kg (162 lb)     Patient's weight is still somewhat elevated from her baseline  Is having some problems with increased swelling to lower extremities and they have increased her Lasix dose temporarily  Continue to monitor her renal function and hopefully will have some improvement in lower extremity edema and shortness of breath with increased dose of Lasix  Other    Ambulatory dysfunction     Because of increasing pain in both lower extremities she was initially hospitalized and then was in rehab  She was discharged from rehab on the 4th and now is at home  Patient apparently is having some problems with fluid overload  Still having pain in the legs but not as severe and she takes Tylenol which does help but not 100%  Also using a Lidoderm patch  She is receiving physical therapy at home and there also checking her blood pressure and oxygen counts  Patient hopefully will have some improvement with rehab  And physical therapy at home         Relevant Medications    Lidocaine 4 % PTCH    Anemia due to chronic kidney disease     Patient is scheduled for lab test the full form tomorrow hopefully checking on her CBC, basic metabolic profile the making sure her blood counts are stable  She is complaining of some shortness of breath with exertion and hopefully this is not secondary to profound anemia             Other Visit Diagnoses     Leg pain, bilateral    -  Primary    Relevant Medications    Lidocaine 4 % PTCH             Reason for visit is 80-year-old female being seen today for transition of care visit after recent hospitalization and rehab problems with diffuse arthritis bilateral lower extremities, ambulatory dysfunction, bilateral leg pain    Encounter provider Gabriele Bernard DO       Provider located at [address] [address]      Recent Visits  Date Type Provider Dept   02/04/22 Telephone [facility]ann [facility] Internal Med   Showing recent visits within past 7 days and meeting all other requirements  Today's Visits  Date Type Provider Dept   02/07/22 Telemedicine Prakash Rodrigez5  [facility] Internal Med   Showing today's visits and meeting all other requirements  Future Appointments  No visits were found meeting these conditions  Showing future appointments within next 150 days and meeting all other requirements       After connecting through [facility], the patient was identified by name and date of birth  Meena William was informed that this is a telemedicine visit and that the visit is being conducted through [address] [place] [address] [facility] and patient was informed that this is a secure, HIPAA-compliant platform  She agrees to proceed     My office door was closed  No one else was in the room  She acknowledged consent and understanding of privacy and security of the video platform  The patient has agreed to participate and understands they can discontinue the visit at any time  Patient is aware this is a billable service  Subjective:     Patient ID: Meena William is a 80 y o  female  Patient is being seen today video telephone visit  Recent hospitalization because of increasing pain and difficulty with ambulation lower extremities  Patient had thorough workup evaluation and post discharge sent to rehab  Patient is now return to home  Still having problems with ambulation lower extremity edema, shortness of breath with exertion but she states overall it is somewhat improved    Her appetite is maintain  No bowel or bladder problems  Is on increased dose of Lasix because increased edema and weight gain  Receiving nursing physical therapy at home      Review of Systems   Constitutional: Positive for activity change (The some difficulties with activity level  Slowly improving with physical therapy ) and fatigue ( Admits to some fatigue)  Negative for appetite change, chills, diaphoresis, fever and unexpected weight change  HENT: Negative  Eyes: Negative  Respiratory: Positive for shortness of breath  Negative for apnea, cough, choking, chest tightness, wheezing and stridor  Cardiovascular: Positive for leg swelling ( some increased edema bilateral lower extremities now on increased dose of diuretic)  Negative for chest pain and palpitations  Gastrointestinal: Negative  Endocrine: Negative  Genitourinary: Negative  Musculoskeletal: Positive for arthralgias, back pain and gait problem  Negative for joint swelling, myalgias, neck pain and neck stiffness  Skin: Negative  Allergic/Immunologic: Negative  Neurological: Positive for weakness  Negative for dizziness, tremors, seizures, syncope, facial asymmetry, speech difficulty, light-headedness, numbness and headaches  A is complaining of generalized weakness not focal   Hematological: Negative  Psychiatric/Behavioral: Negative  Objective: There were no vitals filed for this visit  Physical Exam  Vitals and nursing note reviewed  Constitutional:       Appearance: Normal appearance  Comments: Pleasant 49-year-old female who is awake alert, very hard of hearing, daughters helping with conversation today  At rest in no acute distress  Complains with of difficulties with shortness of breath with exertion  HENT:      Head: Normocephalic and atraumatic        Ears:      Comments: Decreased auditory acuity bilaterally, hearing aids in place  Eyes:      Extraocular Movements: Extraocular movements intact  Conjunctiva/sclera: Conjunctivae normal       Pupils: Pupils are equal, round, and reactive to light  Pulmonary:      Comments: A complains of shortness of breath with exertion  Unable to auscultate her lungs  Neurological:      Mental Status: She is alert and oriented to person, place, and time  Motor: Weakness (Complains generalized weakness to lower extremities nonfocal) present  Psychiatric:         Mood and Affect: Mood normal          Behavior: Behavior normal          Thought Content: Thought content normal          Judgment: Judgment normal              Transitional Care Management Review:  Linn Reddy is a 80 y o  female here for TCM follow up  During the TCM phone call patient stated:    TCM Call (since 1/7/2022)     Date and time call was made  2/7/2022  3:29 PM    Hospital care reviewed  Records reviewed        Patient was hospitialized at  Other (comment)        Comment  [facility]     Date of Admission  01/14/22    Date of discharge  02/04/22    Diagnosis  Right Hip Pain     Disposition  Home    Current Symptoms  None      TCM Call (since 1/7/2022)     Post hospital issues  None    Scheduled for follow up? Yes    I have advised the patient to call PCP with any new or worsening symptoms  Gretchen High MA    Counseling  Patient    Comments  Appointment scheduled for 2/9/2022          I spent 20 minutes with the patient during this visit  Herbert Lopez DO      VIRTUAL VISIT Umer Hobbs verbally agrees to participate in [employer]  Pt is aware that [employer] could be limited without vital signs or the ability to perform a full hands-on physical Monserrat Mcguire understands she or the provider may request at any time to terminate the video visit and request the patient to seek care or treatment in person

## 2022-02-18 DIAGNOSIS — M79.605 LEG PAIN, BILATERAL: ICD-10-CM

## 2022-02-18 DIAGNOSIS — M79.604 LEG PAIN, BILATERAL: ICD-10-CM

## 2022-02-18 DIAGNOSIS — R26.2 AMBULATORY DYSFUNCTION: ICD-10-CM

## 2022-02-18 RX ORDER — LIDOCAINE 4 G/G
1 PATCH TOPICAL DAILY
Qty: 39 PATCH | Refills: 5 | OUTPATIENT
Start: 2022-02-18

## 2022-02-21 DIAGNOSIS — R26.2 AMBULATORY DYSFUNCTION: ICD-10-CM

## 2022-02-21 DIAGNOSIS — M79.605 LEG PAIN, BILATERAL: ICD-10-CM

## 2022-02-21 DIAGNOSIS — M79.604 LEG PAIN, BILATERAL: ICD-10-CM

## 2022-02-21 RX ORDER — LIDOCAINE 4 G/G
1 PATCH TOPICAL DAILY
Qty: 39 PATCH | Refills: 5 | Status: SHIPPED | OUTPATIENT
Start: 2022-02-21 | End: 2022-03-14 | Stop reason: SDUPTHER

## 2022-03-07 DIAGNOSIS — F41.9 ANXIETY: ICD-10-CM

## 2022-03-07 RX ORDER — CITALOPRAM 10 MG/1
10 TABLET ORAL DAILY
Qty: 30 TABLET | Refills: 5 | Status: SHIPPED | OUTPATIENT
Start: 2022-03-07

## 2022-03-09 DIAGNOSIS — E78.5 HYPERLIPIDEMIA, UNSPECIFIED HYPERLIPIDEMIA TYPE: ICD-10-CM

## 2022-03-09 RX ORDER — ATORVASTATIN CALCIUM 10 MG/1
10 TABLET, FILM COATED ORAL DAILY
Qty: 90 TABLET | Refills: 3 | Status: SHIPPED | OUTPATIENT
Start: 2022-03-09

## 2022-03-11 DIAGNOSIS — I10 ESSENTIAL HYPERTENSION: ICD-10-CM

## 2022-03-11 RX ORDER — ATENOLOL 50 MG/1
50 TABLET ORAL DAILY
Qty: 90 TABLET | Refills: 3 | Status: SHIPPED | OUTPATIENT
Start: 2022-03-11

## 2022-03-14 ENCOUNTER — TELEPHONE (OUTPATIENT)
Dept: INTERNAL MEDICINE CLINIC | Facility: CLINIC | Age: 87
End: 2022-03-14

## 2022-03-14 DIAGNOSIS — I12.9 BENIGN HYPERTENSION WITH CHRONIC KIDNEY DISEASE, STAGE III (HCC): ICD-10-CM

## 2022-03-14 DIAGNOSIS — R26.2 AMBULATORY DYSFUNCTION: ICD-10-CM

## 2022-03-14 DIAGNOSIS — M79.604 LEG PAIN, BILATERAL: ICD-10-CM

## 2022-03-14 DIAGNOSIS — M79.605 LEG PAIN, BILATERAL: ICD-10-CM

## 2022-03-14 DIAGNOSIS — N18.30 BENIGN HYPERTENSION WITH CHRONIC KIDNEY DISEASE, STAGE III (HCC): ICD-10-CM

## 2022-03-14 DIAGNOSIS — I50.41 ACUTE COMBINED SYSTOLIC AND DIASTOLIC CONGESTIVE HEART FAILURE (HCC): Primary | ICD-10-CM

## 2022-03-14 RX ORDER — LIDOCAINE 4 G/G
1 PATCH TOPICAL DAILY
Qty: 39 PATCH | Refills: 5 | Status: SHIPPED | OUTPATIENT
Start: 2022-03-14

## 2022-03-14 NOTE — TELEPHONE ENCOUNTER
pts daughter called  They had [facility] come out on Tuesday for a nurse visit  And then had toño come out Saturday to do an emergency visit  Her left arm and bilat thighs are really swollen and dimpling  Sergey Zamora told her to increase her lasix to 3/day  She would like to talk to you to make sure they are doing the correct thing

## 2022-03-14 NOTE — ASSESSMENT & PLAN NOTE
Wt Readings from Last 3 Encounters:   11/01/21 71 7 kg (158 lb)   10/22/21 70 3 kg (155 lb)   06/08/21 73 5 kg (162 lb)         Daughter called today  Apparently patient is gaining weight  Patient's weight is up to 178 lb  Is having increased swelling  Help side agencies have been seeing the patient and suggested she increase her Lasix to 3 pills a day which will be a total of 60 mg  We did write for a slip to check on a basic metabolic profile and BNP

## 2022-03-16 ENCOUNTER — TELEPHONE (OUTPATIENT)
Dept: INTERNAL MEDICINE CLINIC | Facility: CLINIC | Age: 87
End: 2022-03-16

## 2022-03-16 DIAGNOSIS — M79.605 LEG PAIN, BILATERAL: ICD-10-CM

## 2022-03-16 DIAGNOSIS — R26.2 AMBULATORY DYSFUNCTION: ICD-10-CM

## 2022-03-16 DIAGNOSIS — M79.604 LEG PAIN, BILATERAL: ICD-10-CM

## 2022-03-16 RX ORDER — LIDOCAINE 50 MG/G
1 PATCH TOPICAL DAILY
Qty: 39 PATCH | Refills: 5 | Status: SHIPPED | OUTPATIENT
Start: 2022-03-16

## 2022-03-16 NOTE — TELEPHONE ENCOUNTER
Pharmacy called  Her ins does not cover lidocaine patch 4% as its OTC  They want you to send a new script for 5%, to see if that's covered for her

## 2022-03-25 ENCOUNTER — TELEPHONE (OUTPATIENT)
Dept: INTERNAL MEDICINE CLINIC | Facility: CLINIC | Age: 87
End: 2022-03-25

## 2022-03-25 PROBLEM — L89.101 PRESSURE INJURY OF BACK, STAGE 1: Status: ACTIVE | Noted: 2022-03-25

## 2022-03-25 NOTE — TELEPHONE ENCOUNTER
[facility] called and patient has unstageable pressure ulcer and they want to put on allevin dressing with hydrogel  They asked if an order can be placed and they asked if it can be faxed to [phone]  The phone number is [phone]   Thank you

## 2022-03-31 ENCOUNTER — TELEPHONE (OUTPATIENT)
Dept: INTERNAL MEDICINE CLINIC | Facility: CLINIC | Age: 87
End: 2022-03-31

## 2022-03-31 NOTE — TELEPHONE ENCOUNTER
[place] called and asked if you would please be able to call her back due to sharing some updates and the family is anxious  The  number is [phone]    Thank you

## 2024-05-09 NOTE — PROGRESS NOTES
Azithromycin was prescribed for the patient for an acute upper respiratory tract infection with cough, production of a green to yellow mucus  Patient states she has no increasing shortness of breath but some slight fever and chills but not severe  Patient was prescribed azithromycin Z-Favio as noted and was told can take over-the-counter cough medicine such as Robitussin DM to help with her cough and mucus production    She will call if not improving
No